# Patient Record
Sex: FEMALE | Race: BLACK OR AFRICAN AMERICAN | Employment: FULL TIME | URBAN - METROPOLITAN AREA
[De-identification: names, ages, dates, MRNs, and addresses within clinical notes are randomized per-mention and may not be internally consistent; named-entity substitution may affect disease eponyms.]

---

## 2017-02-17 ENCOUNTER — TELEPHONE (OUTPATIENT)
Dept: FAMILY MEDICINE CLINIC | Facility: CLINIC | Age: 47
End: 2017-02-17

## 2017-02-17 ENCOUNTER — OFFICE VISIT (OUTPATIENT)
Dept: FAMILY MEDICINE CLINIC | Facility: CLINIC | Age: 47
End: 2017-02-17

## 2017-02-17 VITALS
HEIGHT: 63 IN | BODY MASS INDEX: 31.89 KG/M2 | DIASTOLIC BLOOD PRESSURE: 90 MMHG | HEART RATE: 70 BPM | WEIGHT: 180 LBS | TEMPERATURE: 99 F | RESPIRATION RATE: 16 BRPM | SYSTOLIC BLOOD PRESSURE: 140 MMHG | OXYGEN SATURATION: 96 %

## 2017-02-17 DIAGNOSIS — G89.29 CHRONIC BILATERAL LOW BACK PAIN WITHOUT SCIATICA: ICD-10-CM

## 2017-02-17 DIAGNOSIS — J00 NASOPHARYNGITIS ACUTE: Primary | ICD-10-CM

## 2017-02-17 DIAGNOSIS — M99.03 LUMBAR REGION SOMATIC DYSFUNCTION: ICD-10-CM

## 2017-02-17 DIAGNOSIS — J11.1 FLU SYNDROME: ICD-10-CM

## 2017-02-17 DIAGNOSIS — M54.50 CHRONIC BILATERAL LOW BACK PAIN WITHOUT SCIATICA: ICD-10-CM

## 2017-02-17 DIAGNOSIS — Z12.39 BREAST CANCER SCREENING: ICD-10-CM

## 2017-02-17 PROBLEM — J06.9 URI (UPPER RESPIRATORY INFECTION): Status: ACTIVE | Noted: 2017-02-17

## 2017-02-17 PROCEDURE — 98927 OSTEOPATH MANJ 5-6 REGIONS: CPT | Performed by: FAMILY MEDICINE

## 2017-02-17 PROCEDURE — 99214 OFFICE O/P EST MOD 30 MIN: CPT | Performed by: FAMILY MEDICINE

## 2017-02-17 NOTE — PROGRESS NOTES
HPI:    Patient ID: Rayne Villagomez is a 48year old female. Cough  This is a new problem. The current episode started in the past 7 days. The problem has been unchanged. The problem occurs every few hours. The cough is non-productive.  Associated symptoms Positive for nausea. Negative for vomiting. Musculoskeletal: Positive for myalgias. Skin: Negative for rash. Neurological: Positive for weakness and headaches. No current outpatient prescriptions on file.   Allergies:No Known Allergies without sciatica  Pain management via prescription medications as needed. 4. Lumbar somatic dysfunction  Immediate relief after manipulation performed. No orders of the defined types were placed in this encounter.        Meds This Visit:  No prescript

## 2017-02-17 NOTE — PATIENT INSTRUCTIONS
Zithromax and allegra D 12 H prescribed. Clear fluid hydration. Rest. Take all medications as prescribed. If symptoms persist or worsen please call or return to clinic ASAP. Too late for Tamiflu. Status currently totally disabled.  Insurance form filled out

## 2017-02-24 ENCOUNTER — OFFICE VISIT (OUTPATIENT)
Dept: FAMILY MEDICINE CLINIC | Facility: CLINIC | Age: 47
End: 2017-02-24

## 2017-02-24 ENCOUNTER — APPOINTMENT (OUTPATIENT)
Dept: LAB | Age: 47
End: 2017-02-24
Attending: FAMILY MEDICINE
Payer: COMMERCIAL

## 2017-02-24 ENCOUNTER — HOSPITAL ENCOUNTER (OUTPATIENT)
Dept: MAMMOGRAPHY | Age: 47
Discharge: HOME | End: 2017-02-24
Attending: FAMILY MEDICINE
Payer: COMMERCIAL

## 2017-02-24 VITALS
HEART RATE: 61 BPM | TEMPERATURE: 98 F | HEIGHT: 63 IN | SYSTOLIC BLOOD PRESSURE: 144 MMHG | DIASTOLIC BLOOD PRESSURE: 92 MMHG | RESPIRATION RATE: 16 BRPM

## 2017-02-24 DIAGNOSIS — Z12.39 BREAST CANCER SCREENING: ICD-10-CM

## 2017-02-24 DIAGNOSIS — Z11.3 SCREEN FOR STD (SEXUALLY TRANSMITTED DISEASE): ICD-10-CM

## 2017-02-24 DIAGNOSIS — J00 ACUTE NASOPHARYNGITIS: ICD-10-CM

## 2017-02-24 DIAGNOSIS — J98.8 VIRAL RESPIRATORY ILLNESS: Primary | ICD-10-CM

## 2017-02-24 DIAGNOSIS — B97.89 VIRAL RESPIRATORY ILLNESS: Primary | ICD-10-CM

## 2017-02-24 DIAGNOSIS — Z00.00 PREVENTATIVE HEALTH CARE: ICD-10-CM

## 2017-02-24 PROBLEM — B34.9 VIRAL ILLNESS: Status: ACTIVE | Noted: 2017-02-24

## 2017-02-24 PROCEDURE — 86803 HEPATITIS C AB TEST: CPT

## 2017-02-24 PROCEDURE — 36415 COLL VENOUS BLD VENIPUNCTURE: CPT

## 2017-02-24 PROCEDURE — 86706 HEP B SURFACE ANTIBODY: CPT

## 2017-02-24 PROCEDURE — 77067 SCR MAMMO BI INCL CAD: CPT

## 2017-02-24 PROCEDURE — 99396 PREV VISIT EST AGE 40-64: CPT | Performed by: FAMILY MEDICINE

## 2017-02-24 PROCEDURE — 93005 ELECTROCARDIOGRAM TRACING: CPT | Performed by: FAMILY MEDICINE

## 2017-02-24 PROCEDURE — 99214 OFFICE O/P EST MOD 30 MIN: CPT | Performed by: FAMILY MEDICINE

## 2017-02-24 PROCEDURE — 80500 HEPATITIS A B + C PROFILE: CPT

## 2017-02-24 PROCEDURE — 86704 HEP B CORE ANTIBODY TOTAL: CPT

## 2017-02-24 PROCEDURE — 93010 ELECTROCARDIOGRAM REPORT: CPT | Performed by: FAMILY MEDICINE

## 2017-02-24 PROCEDURE — 87340 HEPATITIS B SURFACE AG IA: CPT

## 2017-02-24 PROCEDURE — 86708 HEPATITIS A ANTIBODY: CPT

## 2017-02-24 RX ORDER — FEXOFENADINE HCL AND PSEUDOEPHEDRINE HCI 60; 120 MG/1; MG/1
1 TABLET, EXTENDED RELEASE ORAL 2 TIMES DAILY
Qty: 30 TABLET | Refills: 0 | Status: SHIPPED | OUTPATIENT
Start: 2017-02-24

## 2017-02-24 RX ORDER — AZITHROMYCIN 250 MG/1
TABLET, FILM COATED ORAL
Qty: 6 TABLET | Refills: 0 | Status: SHIPPED | OUTPATIENT
Start: 2017-02-24 | End: 2017-03-01

## 2017-02-24 NOTE — PATIENT INSTRUCTIONS
Disability forms done. All adult screening ordered and done appropriate for patient's age and gender and risk factors and complaints. Clear fluid hydration. Rest. Take all medications as prescribed.  If symptoms persist or worsen please call or return to cl

## 2017-02-27 ENCOUNTER — TELEPHONE (OUTPATIENT)
Dept: ADMINISTRATIVE | Age: 47
End: 2017-02-27

## 2017-02-27 LAB
HAV AB SER QL IA: NONREACTIVE
HBV CORE AB SERPL QL IA: NONREACTIVE
HBV SURFACE AB SER-ACNC: <3.1 MIU/ML (ref ?–10)
HBV SURFACE AG SERPL QL IA: NONREACTIVE
HBV SURFACE AG SERPL QL IA: NONREACTIVE
HCV AB SERPL QL IA: NONREACTIVE

## 2017-02-27 NOTE — TELEPHONE ENCOUNTER
Eber afternoon Dr. Abby Valdez term disability form pending in [employer] for patient. Do you approve continuous leave from 2-10-17 with [employer] pending your reevaluation on 3-4-17 for viral illness? Please advise.   Amberly

## 2017-02-28 LAB
ABSOLUTE BASOPHILS: 34 CELLS/UL (ref 0–200)
ABSOLUTE EOSINOPHILS: 103 CELLS/UL (ref 15–500)
ABSOLUTE LYMPHOCYTES: 2022 CELLS/UL (ref 850–3900)
ABSOLUTE MONOCYTES: 266 CELLS/UL (ref 200–950)
ABSOLUTE NEUTROPHILS: 1376 CELLS/UL (ref 1500–7800)
ALBUMIN/GLOBULIN RATIO: 1.4 (CALC) (ref 1–2.5)
ALBUMIN: 4.4 G/DL (ref 3.6–5.1)
ALKALINE PHOSPHATASE: 63 U/L (ref 33–115)
ALT: 29 U/L (ref 6–29)
AST: 36 U/L (ref 10–35)
BASOPHILS: 0.9 %
BILIRUBIN, TOTAL: 0.5 MG/DL (ref 0.2–1.2)
BILIRUBIN: NEGATIVE
BUN: 9 MG/DL (ref 7–25)
CALCIUM: 9.4 MG/DL (ref 8.6–10.2)
CARBON DIOXIDE: 26 MMOL/L (ref 20–31)
CHLORIDE: 106 MMOL/L (ref 98–110)
CHOL/HDLC RATIO: 4.7 (CALC)
CHOLESTEROL, TOTAL: 274 MG/DL (ref 125–200)
COLOR: YELLOW
CREATININE: 0.77 MG/DL (ref 0.5–1.1)
EGFR IF AFRICN AM: 106 ML/MIN/1.73M2
EGFR IF NONAFRICN AM: 91 ML/MIN/1.73M2
EOSINOPHILS: 2.7 %
GLOBULIN: 3.2 G/DL (CALC) (ref 1.9–3.7)
GLUCOSE: 118 MG/DL (ref 65–99)
GLUCOSE: NEGATIVE
HDL CHOLESTEROL: 58 MG/DL
HEMATOCRIT: 39.4 % (ref 35–45)
HEMOGLOBIN: 13.5 G/DL (ref 11.7–15.5)
KETONES: NEGATIVE
LDL-CHOLESTEROL: 192 MG/DL (CALC)
LYMPHOCYTES: 53.2 %
MCH: 31 PG (ref 27–33)
MCHC: 34.3 G/DL (ref 32–36)
MCV: 90.6 FL (ref 80–100)
MONOCYTES: 7 %
MPV: 9.7 FL (ref 7.5–12.5)
NEUTROPHILS: 36.2 %
NITRITE: NEGATIVE
NON-HDL CHOLESTEROL: 216 MG/DL (CALC)
OCCULT BLOOD: NEGATIVE
PH: 6 (ref 5–8)
PLATELET COUNT: 292 THOUSAND/UL (ref 140–400)
POTASSIUM: 4.2 MMOL/L (ref 3.5–5.3)
PROTEIN, TOTAL: 7.6 G/DL (ref 6.1–8.1)
RDW: 13.9 % (ref 11–15)
RED BLOOD CELL COUNT: 4.35 MILLION/UL (ref 3.8–5.1)
SODIUM: 141 MMOL/L (ref 135–146)
SPECIFIC GRAVITY: 1.02 (ref 1–1.03)
TRIGLYCERIDES: 122 MG/DL
TSH W/REFLEX TO FT4: 1.05 MIU/L
WHITE BLOOD CELL COUNT: 3.8 THOUSAND/UL (ref 3.8–10.8)

## 2017-03-04 ENCOUNTER — OFFICE VISIT (OUTPATIENT)
Dept: FAMILY MEDICINE CLINIC | Facility: CLINIC | Age: 47
End: 2017-03-04

## 2017-03-04 ENCOUNTER — APPOINTMENT (OUTPATIENT)
Dept: LAB | Age: 47
End: 2017-03-04
Attending: FAMILY MEDICINE
Payer: COMMERCIAL

## 2017-03-04 VITALS
SYSTOLIC BLOOD PRESSURE: 130 MMHG | DIASTOLIC BLOOD PRESSURE: 90 MMHG | TEMPERATURE: 98 F | HEART RATE: 74 BPM | HEIGHT: 63 IN | RESPIRATION RATE: 16 BRPM

## 2017-03-04 DIAGNOSIS — E78.5 HYPERLIPIDEMIA, UNSPECIFIED HYPERLIPIDEMIA TYPE: ICD-10-CM

## 2017-03-04 DIAGNOSIS — B34.9 VIRAL ILLNESS: Primary | ICD-10-CM

## 2017-03-04 DIAGNOSIS — R73.9 HYPERGLYCEMIA: ICD-10-CM

## 2017-03-04 DIAGNOSIS — R03.0 ELEVATED BLOOD PRESSURE READING: ICD-10-CM

## 2017-03-04 PROCEDURE — 99214 OFFICE O/P EST MOD 30 MIN: CPT | Performed by: FAMILY MEDICINE

## 2017-03-04 PROCEDURE — 99212 OFFICE O/P EST SF 10 MIN: CPT | Performed by: FAMILY MEDICINE

## 2017-03-04 NOTE — PATIENT INSTRUCTIONS
Recommend weight loss via daily exercising and consistent healthy dietary changes. Hydrate well. Back to work 03/06/17.

## 2017-03-04 NOTE — PROGRESS NOTES
HPI:    Patient ID: Mark Copeland is a 48year old female. URI   The problem has been resolved. Associated symptoms include congestion, coughing, headaches, rhinorrhea and sinus pain.  She has tried decongestant, antihistamine and increased fluids (antib Cardiovascular: Normal rate and regular rhythm. Exam reveals no gallop. No murmur heard. Pulmonary/Chest: Effort normal and breath sounds normal. No respiratory distress. ASSESSMENT/PLAN:   1. Viral illness  Resolved    2.  Hyperlipidemi

## 2017-03-06 ENCOUNTER — TELEPHONE (OUTPATIENT)
Dept: FAMILY MEDICINE CLINIC | Facility: CLINIC | Age: 47
End: 2017-03-06

## 2017-03-06 NOTE — TELEPHONE ENCOUNTER
Pt was seen sat 03 04 2017 by Dr Opal Blanchard, pt forgot to get a return back to work note, pt been off work since 02 10 2017, and is returning back today 03 06 2017, pt is waiting in the [facility]PO waiting room

## 2017-03-08 LAB — HEMOGLOBIN A1C: 6.5 % OF TOTAL HGB

## 2018-01-17 ENCOUNTER — CHARTING TRANS (OUTPATIENT)
Dept: OTHER | Age: 48
End: 2018-01-17

## 2018-11-02 VITALS
HEART RATE: 85 BPM | BODY MASS INDEX: 33.01 KG/M2 | DIASTOLIC BLOOD PRESSURE: 80 MMHG | SYSTOLIC BLOOD PRESSURE: 130 MMHG | OXYGEN SATURATION: 100 % | RESPIRATION RATE: 16 BRPM | WEIGHT: 186.29 LBS | HEIGHT: 63 IN | TEMPERATURE: 98.9 F

## 2022-08-15 ENCOUNTER — TELEPHONE (OUTPATIENT)
Dept: OTHER | Age: 52
End: 2022-08-15

## 2022-08-15 NOTE — TELEPHONE ENCOUNTER
Dionte Assessment. Last visit 2017. New patient protocol. Patient calling office, transferred to triage from Call Center. Wants to see Dr. Mandeep Wang only. Reports hives currently on chest, stomach, sides, upper thighs, proximal to groin, left wrist  Onset -- 1 month ago. Daily occurrence. Improves with Benadryl. But comes back. Went to a clinic, [address] [place] [address] along the [facility]. She thought it was an ER, but they could not draw blood or do additional testing there. She was given 50 mg tablets of prednisone. Rx benadryl anophen 25mg take 1 capsule Q6h PRN as needed for sleep. Denies shortness of breath, difficulty breathing, chest pain, chest pressure. Speaking in full sentences. Itchy. Seems to happen after eating sweets, such as Sprite and cheesecake, Caramel popcorn . RN advised reading labels and ingredients to better understand what she is allergic to. She verbalizes understanding. Due to current hives, RN advised Immediate [facility], address provided, and then make Annual Physical with Dr. Mandeep Wang. She verbalizes understanding of all information, and agreeable to plan.

## 2022-08-18 ENCOUNTER — OFFICE VISIT (OUTPATIENT)
Dept: FAMILY MEDICINE CLINIC | Facility: CLINIC | Age: 52
End: 2022-08-18
Payer: COMMERCIAL

## 2022-08-18 VITALS
SYSTOLIC BLOOD PRESSURE: 149 MMHG | OXYGEN SATURATION: 97 % | WEIGHT: 180.38 LBS | TEMPERATURE: 98 F | HEART RATE: 76 BPM | BODY MASS INDEX: 31.96 KG/M2 | RESPIRATION RATE: 16 BRPM | DIASTOLIC BLOOD PRESSURE: 88 MMHG | HEIGHT: 63 IN

## 2022-08-18 DIAGNOSIS — I10 PRIMARY HYPERTENSION: ICD-10-CM

## 2022-08-18 DIAGNOSIS — L50.9 URTICARIA: ICD-10-CM

## 2022-08-18 DIAGNOSIS — T78.3XXD ANGIOEDEMA, SUBSEQUENT ENCOUNTER: Primary | ICD-10-CM

## 2022-08-18 DIAGNOSIS — M06.341: ICD-10-CM

## 2022-08-18 DIAGNOSIS — R73.03 PREDIABETES: ICD-10-CM

## 2022-08-18 PROCEDURE — 3079F DIAST BP 80-89 MM HG: CPT | Performed by: FAMILY MEDICINE

## 2022-08-18 PROCEDURE — 3077F SYST BP >= 140 MM HG: CPT | Performed by: FAMILY MEDICINE

## 2022-08-18 PROCEDURE — 99214 OFFICE O/P EST MOD 30 MIN: CPT | Performed by: FAMILY MEDICINE

## 2022-08-18 PROCEDURE — 3008F BODY MASS INDEX DOCD: CPT | Performed by: FAMILY MEDICINE

## 2022-08-18 RX ORDER — AMLODIPINE BESYLATE 10 MG/1
10 TABLET ORAL DAILY
COMMUNITY
Start: 2020-10-27 | End: 2022-08-18

## 2022-08-18 RX ORDER — FLASH GLUCOSE SENSOR
1 KIT MISCELLANEOUS
Qty: 1 EACH | Refills: 11 | Status: SHIPPED | OUTPATIENT
Start: 2022-08-18

## 2022-08-18 RX ORDER — METFORMIN HYDROCHLORIDE 500 MG/1
500 TABLET, EXTENDED RELEASE ORAL 2 TIMES DAILY
Qty: 180 TABLET | Refills: 1 | Status: SHIPPED | OUTPATIENT
Start: 2022-08-18

## 2022-08-18 RX ORDER — FLASH GLUCOSE SCANNING READER
1 EACH MISCELLANEOUS
Qty: 1 EACH | Refills: 0 | Status: SHIPPED | OUTPATIENT
Start: 2022-08-18

## 2022-08-18 RX ORDER — AMLODIPINE BESYLATE 10 MG/1
10 TABLET ORAL DAILY
Qty: 90 TABLET | Refills: 1 | Status: SHIPPED | OUTPATIENT
Start: 2022-08-18

## 2022-08-18 RX ORDER — DIPHENHYDRAMINE HCL 25 MG
25 CAPSULE ORAL
COMMUNITY
Start: 2022-08-02

## 2022-08-18 RX ORDER — METFORMIN HYDROCHLORIDE 500 MG/1
1 TABLET, EXTENDED RELEASE ORAL 2 TIMES DAILY
COMMUNITY
Start: 2021-01-20 | End: 2022-08-18

## 2022-08-18 NOTE — PATIENT INSTRUCTIONS
Medrol Dosepak prescribed. Patient can continue with the Allegra while doing the Medrol Dosepak. Increase water consumption. Decrease dairy consumption if you are a dairy summer. Patient referred to allergy/immunology for work-up for urticaria/angioedema. Patient referred to plastics/hand. Freestyle cody also prescribed. Encouraged physical fitness and daily physical activity daily (PLAY). Recommend weight loss via daily exercising and consistent healthy dietary changes.

## 2022-09-04 NOTE — Clinical Note
3/6/2017          To Whom It May Concern:    Winnie Sicard is currently under my medical care and may not return to work at this time. Please excuse [place] for 4 weeks. Beginning 02/10/2017  She may return to work on 03/06/2017.   Activity is restricted
No

## 2022-10-04 ENCOUNTER — OFFICE VISIT (OUTPATIENT)
Dept: ALLERGY | Facility: CLINIC | Age: 52
End: 2022-10-04
Payer: COMMERCIAL

## 2022-10-04 ENCOUNTER — NURSE ONLY (OUTPATIENT)
Dept: ALLERGY | Facility: CLINIC | Age: 52
End: 2022-10-04
Payer: COMMERCIAL

## 2022-10-04 VITALS — OXYGEN SATURATION: 100 % | HEART RATE: 85 BPM | SYSTOLIC BLOOD PRESSURE: 126 MMHG | DIASTOLIC BLOOD PRESSURE: 84 MMHG

## 2022-10-04 DIAGNOSIS — J30.89 ENVIRONMENTAL AND SEASONAL ALLERGIES: ICD-10-CM

## 2022-10-04 DIAGNOSIS — J30.89 SEASONAL AND PERENNIAL ALLERGIC RHINOCONJUNCTIVITIS: ICD-10-CM

## 2022-10-04 DIAGNOSIS — L50.1 CHRONIC IDIOPATHIC URTICARIA: Primary | ICD-10-CM

## 2022-10-04 DIAGNOSIS — Z23 FLU VACCINE NEED: ICD-10-CM

## 2022-10-04 DIAGNOSIS — Z92.29 COVID-19 VACCINE SERIES COMPLETED: ICD-10-CM

## 2022-10-04 DIAGNOSIS — Z91.018 FOOD ALLERGY: ICD-10-CM

## 2022-10-04 DIAGNOSIS — J30.2 SEASONAL AND PERENNIAL ALLERGIC RHINOCONJUNCTIVITIS: ICD-10-CM

## 2022-10-04 DIAGNOSIS — T78.3XXA ANGIOEDEMA, INITIAL ENCOUNTER: ICD-10-CM

## 2022-10-04 DIAGNOSIS — H10.10 SEASONAL AND PERENNIAL ALLERGIC RHINOCONJUNCTIVITIS: ICD-10-CM

## 2022-10-04 PROCEDURE — 95024 IQ TESTS W/ALLERGENIC XTRCS: CPT | Performed by: ALLERGY & IMMUNOLOGY

## 2022-10-04 PROCEDURE — 95004 PERQ TESTS W/ALRGNC XTRCS: CPT | Performed by: ALLERGY & IMMUNOLOGY

## 2022-10-04 RX ORDER — LEVOCETIRIZINE DIHYDROCHLORIDE 5 MG/1
5 TABLET, FILM COATED ORAL EVERY EVENING
Qty: 90 TABLET | Refills: 1 | Status: SHIPPED | OUTPATIENT
Start: 2022-10-04

## 2022-10-04 NOTE — PATIENT INSTRUCTIONS
#1 chronic idiopathic urticaria  Handouts on chronic hives provided and reviewed including the majority being idiopathic in nature with no specific cause found  See above skin testing to common food and environmental allergens to screen for allergic trigger  Check TSH and C4  Continue with Allegra 180 mg once a day up to 4 times per day if needed  If worsening with Allegra then may consider Xyzal in place of Allegra. Xyzal dosing would be 5 mg once a day up to 4 times per day if needed   Consider Xolair if refractory to antihistamines      #2 allergic rhinitis  Reviewed avoidance measures and potential treatment option of immunotherapy  Continue with Allegra 180 mg once a day if having runny nose sneezing itchy watery eyes  May add Flonase or Nasacort 2 sprays per nostril once a day if having prominent nasal congestion postnasal drip    #3 COVID vaccination up-to-date x3 doses.   Reviewed booster fourth dose is indicated for 50 and older    #4 flu vaccine recommended this fall

## 2023-02-06 ENCOUNTER — TELEPHONE (OUTPATIENT)
Dept: ALLERGY | Facility: CLINIC | Age: 53
End: 2023-02-06

## 2023-02-06 NOTE — TELEPHONE ENCOUNTER
Labs from 10/4/2022 have not been completed. Letter sent home. Postponed x 2 months. Dr. Campa Nikhil, if labs have not been completed in that time okay to cancel?

## 2023-02-21 RX ORDER — AMLODIPINE BESYLATE 10 MG/1
TABLET ORAL
Qty: 90 TABLET | Refills: 1 | Status: SHIPPED | OUTPATIENT
Start: 2023-02-21

## 2023-02-21 RX ORDER — METFORMIN HYDROCHLORIDE 500 MG/1
TABLET, EXTENDED RELEASE ORAL
Qty: 180 TABLET | Refills: 1 | Status: SHIPPED | OUTPATIENT
Start: 2023-02-21

## 2023-04-14 ENCOUNTER — TELEPHONE (OUTPATIENT)
Dept: FAMILY MEDICINE CLINIC | Facility: CLINIC | Age: 53
End: 2023-04-14

## 2023-04-14 DIAGNOSIS — Z12.31 ENCOUNTER FOR SCREENING MAMMOGRAM FOR BREAST CANCER: Primary | ICD-10-CM

## 2023-04-14 NOTE — TELEPHONE ENCOUNTER
Referral for [facility] has been generated in the chart if you can just give them a call to schedule an appointment.

## 2023-09-21 RX ORDER — AMLODIPINE BESYLATE 10 MG/1
10 TABLET ORAL DAILY
Qty: 90 TABLET | Refills: 0 | Status: SHIPPED | OUTPATIENT
Start: 2023-09-21

## 2023-09-21 RX ORDER — METFORMIN HYDROCHLORIDE 500 MG/1
500 TABLET, EXTENDED RELEASE ORAL 2 TIMES DAILY
Qty: 180 TABLET | Refills: 0 | Status: SHIPPED | OUTPATIENT
Start: 2023-09-21

## 2023-09-22 ENCOUNTER — TELEPHONE (OUTPATIENT)
Dept: FAMILY MEDICINE CLINIC | Facility: CLINIC | Age: 53
End: 2023-09-22

## 2023-09-22 NOTE — TELEPHONE ENCOUNTER
Pt needs a physical appt with Dr Soren Babb only. Her medication will run out in 90 days. His first availability was next year Jan 30th, 2024 pt declined. .    Call her at only number on file.

## 2023-09-22 NOTE — TELEPHONE ENCOUNTER
Please review. Protocol failed / No Protocol.     Requested Prescriptions   Pending Prescriptions Disp Refills    METFORMIN  MG Oral Tablet 24 Hr [Pharmacy Med Name: METFORMIN ER 500MG 24HR TABS] 180 tablet 1     Sig: TAKE 1 TABLET(500 MG) BY MOUTH TWICE DAILY       Diabetes Medication Protocol Failed - 9/21/2023  6:12 AM        Failed - Last A1C < 7.5 and within past 6 months     Lab Results   Component Value Date    A1C 6.5 (H) 03/04/2017             Failed - In person appointment or virtual visit in the past 6 mos or appointment in next 3 mos     Recent Outpatient Visits              11 months ago Environmental and seasonal allergies    [facility], [address] [facility] [address]    Nurse Only    11 months ago Chronic idiopathic urticaria    [facility], Jeanie Campbell MD    Office Visit    1 year ago Angioedema, subsequent encounter    [address] [facility], Uriel Murray, DO    Office Visit    6 years ago Viral illness    [address] [facility], Uriel Murray, DO    Office Visit    6 years ago Viral respiratory illness    [facility], [address], Robe ADAMSON, DO    Office Visit                      Failed - EGFRCR or GFRAA > 50     GFR Evaluation            Failed - GFR in the past 12 months          AMLODIPINE 10 MG Oral Tab [Pharmacy Med Name: AMLODIPINE BESYLATE 10MG TABLETS] 90 tablet 1     Sig: TAKE 1 TABLET(10 MG) BY MOUTH DAILY       Hypertensive Medications Protocol Failed - 9/21/2023  6:12 AM        Failed - In person appointment in the past 12 or next 3 months     Recent Outpatient Visits              11 months ago Environmental and seasonal allergies    [facility], [address], [place]    Nurse Only    11 months ago [facility] idiopathic urticaria    [facility]    Office Visit    1 year ago Angioedema, subsequent encounter    [address], [place], [place], [place]    Office Visit    6 years ago Viral illness    [address], [place], [facility], [place]    Office Visit    6 years ago Viral respiratory illness    [facility] [place], [place]    Office Visit                      Failed - CMP or BMP in past 6 months     No results found for this or any previous visit (from the past 4392 hour(s)).             Failed - In person appointment or virtual visit in the past 6 months     Recent Outpatient Visits              11 months ago Environmental and seasonal allergies    [facility], [address]    Nurse Only    11 months ago Chronic idiopathic urticaria    [facility], [address] Jeanie [address]aylor MD    Office Visit    1 year ago Angioedema, subsequent encounter    [address], [place], [facility],     Office Visit    6 years ago Viral illness    [address], [facility], [place]    Office Visit    6 years ago Viral respiratory illness    [facility], [facility], [place], [place]    Office Visit                      Failed - EGFRCR or GFRAA > 50     GFR Evaluation            Passed - Last BP reading less than 140/90     BP Readings from Last 1 Encounters:  10/04/22 : 126/84

## 2023-09-23 NOTE — TELEPHONE ENCOUNTER
Spoke to patient and appt given for 1/30/2024 at 2pm with Dr Jenna Burdick. Pt verbalized understanding.

## 2024-01-30 ENCOUNTER — LAB ENCOUNTER (OUTPATIENT)
Dept: LAB | Age: 54
End: 2024-01-30
Attending: FAMILY MEDICINE
Payer: COMMERCIAL

## 2024-01-30 ENCOUNTER — OFFICE VISIT (OUTPATIENT)
Dept: FAMILY MEDICINE CLINIC | Facility: CLINIC | Age: 54
End: 2024-01-30

## 2024-01-30 VITALS
BODY MASS INDEX: 31.89 KG/M2 | SYSTOLIC BLOOD PRESSURE: 145 MMHG | DIASTOLIC BLOOD PRESSURE: 82 MMHG | WEIGHT: 180 LBS | OXYGEN SATURATION: 96 % | HEIGHT: 63 IN | HEART RATE: 100 BPM

## 2024-01-30 DIAGNOSIS — I10 PRIMARY HYPERTENSION: ICD-10-CM

## 2024-01-30 DIAGNOSIS — Z12.31 ENCOUNTER FOR SCREENING MAMMOGRAM FOR BREAST CANCER: ICD-10-CM

## 2024-01-30 DIAGNOSIS — Z00.00 ENCOUNTER FOR ANNUAL PHYSICAL EXAM: Primary | ICD-10-CM

## 2024-01-30 DIAGNOSIS — Z23 NEED FOR DIPHTHERIA-TETANUS-PERTUSSIS (TDAP) VACCINE: ICD-10-CM

## 2024-01-30 DIAGNOSIS — Z23 NEED FOR ZOSTER VACCINATION: ICD-10-CM

## 2024-01-30 DIAGNOSIS — R73.03 PREDIABETES: ICD-10-CM

## 2024-01-30 DIAGNOSIS — N95.1 MENOPAUSAL SYNDROME (HOT FLASHES): ICD-10-CM

## 2024-01-30 DIAGNOSIS — Z12.11 COLON CANCER SCREENING: ICD-10-CM

## 2024-01-30 DIAGNOSIS — L50.9 URTICARIA: ICD-10-CM

## 2024-01-30 DIAGNOSIS — E66.9 CLASS 1 OBESITY WITH SERIOUS COMORBIDITY AND BODY MASS INDEX (BMI) OF 31.0 TO 31.9 IN ADULT, UNSPECIFIED OBESITY TYPE: ICD-10-CM

## 2024-01-30 PROCEDURE — 99213 OFFICE O/P EST LOW 20 MIN: CPT | Performed by: FAMILY MEDICINE

## 2024-01-30 PROCEDURE — 90750 HZV VACC RECOMBINANT IM: CPT | Performed by: FAMILY MEDICINE

## 2024-01-30 PROCEDURE — 99396 PREV VISIT EST AGE 40-64: CPT | Performed by: FAMILY MEDICINE

## 2024-01-30 PROCEDURE — 3079F DIAST BP 80-89 MM HG: CPT | Performed by: FAMILY MEDICINE

## 2024-01-30 PROCEDURE — 3008F BODY MASS INDEX DOCD: CPT | Performed by: FAMILY MEDICINE

## 2024-01-30 PROCEDURE — 3077F SYST BP >= 140 MM HG: CPT | Performed by: FAMILY MEDICINE

## 2024-01-30 PROCEDURE — 90471 IMMUNIZATION ADMIN: CPT | Performed by: FAMILY MEDICINE

## 2024-01-30 PROCEDURE — 90715 TDAP VACCINE 7 YRS/> IM: CPT | Performed by: FAMILY MEDICINE

## 2024-01-30 PROCEDURE — 90472 IMMUNIZATION ADMIN EACH ADD: CPT | Performed by: FAMILY MEDICINE

## 2024-01-30 NOTE — PATIENT INSTRUCTIONS
All adult screening ordered and done appropriate for patient's age and gender and risk factors and complaints.  Recommend weight loss via daily exercising and consistent healthy dietary changes.  Encouraged physical fitness and daily physical activity daily.  Medication reviewed and renewed where needed and appropriate.  Comply with medications.  Mammogram ordered.  Gastro referral.

## 2024-01-30 NOTE — PROGRESS NOTES
Subjective:     Patient ID: Tino Hoff is a 53 year old female.    53 year old obese by definition/hypertensive/prediabetic AA female here for complete preventive care physical and for status update on any confirmed chronic medical illnesses and follow up on any previous labs or procedures that were suggestive or in need of further work up. Colonoscopy is due. Bowel and bladder functions are intact.    Patient denies headaches, chest pain, dizziness, shortness of breath, visual changes, exertional fatigue, urinary frequency, or excessive hunger and thirst.    Patient consents to zoster and Tdap vaccine.  Patient declines on influenza vaccine.    Patient is motivated for weight loss and would like to try one of the injectable diabetic/weight management products.    Patient still suffering from excessive night sweats and fluctuating blood pressures and spite her personal efforts to improve when she can.  Patient also complains of steady weight gain which all may be associated with menopausal syndrome.                History/Other:   Review of Systems  Current Outpatient Medications   Medication Sig Dispense Refill    metFORMIN  MG Oral Tablet 24 Hr Take 1 tablet (500 mg total) by mouth 2 (two) times daily. 180 tablet 0    amLODIPine 10 MG Oral Tab Take 1 tablet (10 mg total) by mouth daily. 90 tablet 0    Multiple Vitamin (MULTIVITAMIN ADULT OR) Take by mouth daily.      Continuous Blood Gluc  (FREESTYLE LEONARDO 2 READER) Does not apply Device 1 Stick TID AC&HS. 1 each 0    Continuous Blood Gluc Sensor (FREESTYLE LEONARDO 2 SENSOR) Does not apply Misc 1 Stick TID AC&HS. 1 each 11    levocetirizine 5 MG Oral Tab Take 1 tablet (5 mg total) by mouth every evening. (Patient not taking: Reported on 1/30/2024) 90 tablet 1    diphenhydrAMINE 25 MG Oral Cap Take 25 mg by mouth. (Patient not taking: Reported on 1/30/2024)      Fexofenadine-Pseudoephed ER  MG Oral Tablet 12 Hr Take 1 tablet by mouth 2 (two)  times daily. (Patient not taking: Reported on 1/30/2024) 30 tablet 0     Allergies:No Known Allergies    No past medical history on file.   Past Surgical History:   Procedure Laterality Date    HYSTERECTOMY  2007      Family History   Problem Relation Age of Onset    Stroke Mother     Cancer Sister         lung      Social History:   Social History     Socioeconomic History    Marital status: Unknown   Tobacco Use    Smoking status: Never    Smokeless tobacco: Never   Substance and Sexual Activity    Alcohol use: Yes     Comment: socially; 4 drinks per month   Other Topics Concern    Caffeine Concern Yes     Comment: coffee        Objective:   Vitals:    01/30/24 1431   BP: 145/82   Pulse:        Physical Exam  Constitutional:       General: She is not in acute distress.     Appearance: She is not ill-appearing.   HENT:      Head: Normocephalic and atraumatic.      Right Ear: Tympanic membrane normal.      Left Ear: Tympanic membrane normal.      Nose: Nose normal.      Mouth/Throat:      Mouth: Mucous membranes are moist.   Neck:      Thyroid: No thyromegaly.   Cardiovascular:      Rate and Rhythm: Normal rate and regular rhythm.      Heart sounds:      No gallop.   Pulmonary:      Effort: Pulmonary effort is normal. No respiratory distress.      Breath sounds: Normal breath sounds.   Neurological:      Mental Status: She is alert and oriented to person, place, and time.         Assessment & Plan:   1. Encounter for annual physical exam  General well exam and the following labs have been ordered  - CBC [6399] [Q]  - COMP METABOLIC PANEL [27814] [Q]  - LIPID PANEL [7600] [Q]  - TSH W REFLEX TO FREE T4 [31304][Q]  - URINALYSIS, ROUTINE [4973][Q]    2. Menopausal syndrome (hot flashes)  See patient instructions.  We might consider hormone replacement therapy.  Patient may benefit from clonidine patch.    3. Primary hypertension  Not quite to goal when measured manually or electronically.  Compliance with blood  pressure medication emphasized and encouraged.    4. Urticaria  Currently inactive.  We may consider allergy testing again.    5. Prediabetes  Prescribed.  - semaglutide-weight management 0.5 MG/0.5ML Subcutaneous Solution Auto-injector; Inject 0.5 mL (0.5 mg total) into the skin once a week for 4 doses.  Dispense: 2 mL; Refill: 0  - HGB A1C [496] [Q]    6. Colon cancer screening  Referred.  - Gastro Referral - Jeanie ([place])    7. Encounter for screening mammogram for breast cancer  Mammogram ordered.  - [facility] DEE DEE 2D+3D SCREENING BILAT (CPT=77067/01496); Future    8. Need for diphtheria-tetanus-pertussis (Tdap) vaccine  Ordered and administered on today.  - TETANUS, DIPHTHERIA TOXOIDS AND ACELLULAR PERTUSIS VACCINE (TDAP), >7 YEARS, IM USE    9. Need for zoster vaccination  Ordered and administered on today.  - Zoster Recombinant Adjuvanted (Shingrix -Shingles) [48582]    10. Class 1 obesity with serious comorbidity and body mass index (BMI) of 31.0 to 31.9 in adult, unspecified obesity type  Prescribed.  - semaglutide-weight management 0.5 MG/0.5ML Subcutaneous Solution Auto-injector; Inject 0.5 mL (0.5 mg total) into the skin once a week for 4 doses.  Dispense: 2 mL; Refill: 0  - HGB A1C [496] [Q]      No orders of the defined types were placed in this encounter.      Meds This Visit:  Requested Prescriptions      No prescriptions requested or ordered in this encounter       Imaging & Referrals:  None     Patient Instructions   All adult screening ordered and done appropriate for patient's age and gender and risk factors and complaints.  Recommend weight loss via daily exercising and consistent healthy dietary changes.  Encouraged physical fitness and daily physical activity daily.  Medication reviewed and renewed where needed and appropriate.  Comply with medications.  Mammogram ordered.  Gastro referral.    Return in about 1 year (around 1/30/2025), or if symptoms worsen or fail to improve.

## 2024-01-31 LAB
ABSOLUTE BASOPHILS: 70 CELLS/UL (ref 0–200)
ABSOLUTE EOSINOPHILS: 70 CELLS/UL (ref 15–500)
ABSOLUTE LYMPHOCYTES: 2259 CELLS/UL (ref 850–3900)
ABSOLUTE MONOCYTES: 250 CELLS/UL (ref 200–950)
ABSOLUTE NEUTROPHILS: 1451 CELLS/UL (ref 1500–7800)
ALBUMIN/GLOBULIN RATIO: 1.3 (CALC) (ref 1–2.5)
ALBUMIN: 4.5 G/DL (ref 3.6–5.1)
ALKALINE PHOSPHATASE: 76 U/L (ref 37–153)
ALT: 29 U/L (ref 6–29)
APPEARANCE: CLEAR
AST: 14 U/L (ref 10–35)
BASOPHILS: 1.7 %
BILIRUBIN, TOTAL: 0.4 MG/DL (ref 0.2–1.2)
BILIRUBIN: NEGATIVE
BUN: 11 MG/DL (ref 7–25)
CALCIUM: 10.1 MG/DL (ref 8.6–10.4)
CARBON DIOXIDE: 23 MMOL/L (ref 20–32)
CHLORIDE: 102 MMOL/L (ref 98–110)
CHOL/HDLC RATIO: 4.7 (CALC)
CHOLESTEROL, TOTAL: 315 MG/DL
COLOR: YELLOW
CREATININE: 0.72 MG/DL (ref 0.5–1.03)
EGFR: 99 ML/MIN/1.73M2
EOSINOPHILS: 1.7 %
GLOBULIN: 3.4 G/DL (CALC) (ref 1.9–3.7)
GLUCOSE: 267 MG/DL (ref 65–99)
HDL CHOLESTEROL: 67 MG/DL
HEMATOCRIT: 38.8 % (ref 35–45)
HEMOGLOBIN A1C: 9.9 % OF TOTAL HGB
HEMOGLOBIN: 13.6 G/DL (ref 11.7–15.5)
KETONES: NEGATIVE
LDL-CHOLESTEROL: 212 MG/DL (CALC)
LEUKOCYTE ESTERASE: NEGATIVE
LYMPHOCYTES: 55.1 %
MCH: 31.2 PG (ref 27–33)
MCHC: 35.1 G/DL (ref 32–36)
MCV: 89 FL (ref 80–100)
MONOCYTES: 6.1 %
MPV: 11.2 FL (ref 7.5–12.5)
NEUTROPHILS: 35.4 %
NITRITE: NEGATIVE
NON-HDL CHOLESTEROL: 248 MG/DL (CALC)
OCCULT BLOOD: NEGATIVE
PLATELET COUNT: 340 THOUSAND/UL (ref 140–400)
POTASSIUM: 3.9 MMOL/L (ref 3.5–5.3)
PROTEIN, TOTAL: 7.9 G/DL (ref 6.1–8.1)
PROTEIN: NEGATIVE
RDW: 12.7 % (ref 11–15)
RED BLOOD CELL COUNT: 4.36 MILLION/UL (ref 3.8–5.1)
SODIUM: 137 MMOL/L (ref 135–146)
SPECIFIC GRAVITY: 1.04 (ref 1–1.03)
TRIGLYCERIDES: 184 MG/DL
TSH W/REFLEX TO FT4: 1.27 MIU/L
WHITE BLOOD CELL COUNT: 4.1 THOUSAND/UL (ref 3.8–10.8)

## 2024-02-04 DIAGNOSIS — E11.65 UNCONTROLLED TYPE 2 DIABETES MELLITUS WITH HYPERGLYCEMIA (HCC): Primary | ICD-10-CM

## 2024-02-04 RX ORDER — LOSARTAN POTASSIUM 50 MG/1
50 TABLET ORAL DAILY
Qty: 90 TABLET | Refills: 1 | Status: SHIPPED | OUTPATIENT
Start: 2024-02-04

## 2024-02-04 RX ORDER — SIMVASTATIN 10 MG
10 TABLET ORAL DAILY
Qty: 90 TABLET | Refills: 1 | Status: SHIPPED | OUTPATIENT
Start: 2024-02-04 | End: 2024-08-02

## 2024-02-09 ENCOUNTER — TELEPHONE (OUTPATIENT)
Dept: ENDOCRINOLOGY | Facility: HOSPITAL | Age: 54
End: 2024-02-09

## 2024-02-09 DIAGNOSIS — E11.65 TYPE 2 DIABETES MELLITUS WITH HYPERGLYCEMIA (HCC): Primary | ICD-10-CM
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
## 2024-02-10 DIAGNOSIS — N95.1 MENOPAUSAL SYNDROME (HOT FLASHES): Primary | ICD-10-CM

## 2024-02-10 RX ORDER — CLONIDINE 0.2 MG/24H
1 PATCH, EXTENDED RELEASE TRANSDERMAL WEEKLY
Qty: 12 PATCH | Refills: 1 | Status: SHIPPED | OUTPATIENT
Start: 2024-02-10

## 2024-03-02 ENCOUNTER — HOSPITAL ENCOUNTER (OUTPATIENT)
Dept: ENDOCRINOLOGY | Facility: HOSPITAL | Age: 54
Discharge: HOME | End: 2024-03-02
Attending: FAMILY MEDICINE
Payer: COMMERCIAL

## 2024-03-02 VITALS — BODY MASS INDEX: 32 KG/M2 | WEIGHT: 178.81 LBS

## 2024-03-02 DIAGNOSIS — E11.65 TYPE 2 DIABETES MELLITUS WITH HYPERGLYCEMIA, WITHOUT LONG-TERM CURRENT USE OF INSULIN (HCC): Primary | ICD-10-CM

## 2024-03-02 PROCEDURE — 97802 MEDICAL NUTRITION INDIV IN: CPT

## 2024-03-02 NOTE — PROGRESS NOTES
Medical Nutrition Therapy Assessment    Tino Hoff 6/25/1970 was seen for individual Diabetic Medical Nutrition Therapy:  initial/individual    Date: 3/2/2024   Start time 1050  End time: 1135      Assessment  H/o diabetes for 5-6 years; she says that she is at her highest weight.  She says that she retired and thinks her diet got off track but now works part-time again and is doing somewhat better. She admits that he problem is that she is always thinking about food and she is a grazer, so she may snack too much.  She is trying to get more physical activity.  Pt reports that she has been having hot flashes and is not sure if it is menopause related.     Anthropometrics:  178.8# with shoes    Current diabetes medications:  None, but she has Rx and is waiting for Ozempic to be available      Labs:  Lab Results   Component Value Date    A1C 9.9 (H) 01/30/2024    A1C 6.5 (H) 03/04/2017    CHOLEST 315 (H) 01/30/2024    CHOLEST 274 (H) 02/24/2017     (H) 01/30/2024     (H) 02/24/2017    HDL 67 01/30/2024    HDL 58 02/24/2017    NONHDLC 248 (H) 01/30/2024    NONHDLC 216 (H) 02/24/2017    TRIG 184 (H) 01/30/2024    TRIG 122 02/24/2017    BUN 11 01/30/2024    BUN 9 02/24/2017    CREATSERUM 0.72 01/30/2024    CREATSERUM 0.77 02/24/2017    GFRNAA 91 02/24/2017    GFRNAA 77 08/14/2014    GFRAA 106 02/24/2017    GFRAA 89 08/14/2014       SMBG at home: yes, pt has a Freestyle Satish but is not wearing it right now.  She she has one more sensor which she says she will start today.  She also agrees to enter meals and snacks in the Satish under notes.      Diet Hx:  (a.m.) 4:30am Lemon water,   Later on- cup of coffee with flavored coffee creamer or honey  Breakfast:  Cup of oatmeal from [employer]  Lunch:  Big Salad from [employer] splits with coworker, or brings a lunch  Dinner:  Does cook some nights, Beans made with turkey neck, greens for a salad.  If she gets home late, may just drink a protein shake  (snacks)  fruit, used to have donut on occasion  (fluids) OJ on occasion      Physical Activity: 4 days per week- she walks and does stairs all shift at work, may join her neighborhood fitness ctr and/or start  Walking on her off days      Nutrition Diagnosis  Intake: inconsistent carbohydrate intake  Behavioral and environmental: nutrition and nutrition-related knowledge deficit      Intervention  Comprehensive Nutrition Education Provided:     [x] discussed healthy weight management, glucose management, lipid management, and BP management as related to diabetes   [x] discussed basic meal planning guidelines for diabetes regular mealtime, limited concentrated sweets. Worked on establishing eating pattern/timing of meals and snacks    [x] discussed in depth meal planning using the healthy eating with diabetes plate method with focus on balanced macronutrient consumption, including identifying foods that are carbohydrates, lean protein, non-starchy vegetables, and heart healthy fats   [] stressed importance of carbohydrate consistency in each meal   [] recommended carbohydrate targets of  grams at meals and  grams at snacks >>  will do at follow up visit   [] educated on label reading   [] educated on portion control; including use of measuring cups, spoons, or food scale   [] provided suggestions for lower carb, healthy snacks   [x] educated on importance of food monitoring and how to use food logs   [] discussed how to handle special occasions, dining out, and eating on the go   [] discussed menu planning, healthy food shopping, cooking tips, and need to pre prepare foods    [] educated on emotional eating and being mindful at meals   []  reviewed other behavior modification strategies    [x]emphasized the need for small, sustainable changes and working on SMART goals to encourage sustainable behaviors    [] instructions on how to use helpful websites or nutrition/tracking apps reviewed    [] taught to use carbohydrate to  insulin ratio and insulin sensitivity factor    [] discussed barriers and overcoming barriers to best achieve meal planning goals    [] discussed Mediterranean diet/DASH diet, as appropriate, to address lipids or BP   [] reviewed renal diet components and how to incorporate this with diabetes meal planning     Education on Increased Physical Activity:  discussed how increased physical activity improves insulin resistance, blood glucose control, and heart health    Monitoring  diet modification/understanding, blood sugars, hemoglobin A1c, and weight trends    Evaluation  Behavioral Goal(s) selected:   Healthy Eating and Monitoring Blood Glucose    Support Plan:  The use of Diabetes Websites or Apps    Plan  Blood sugar goals: less than 130 mg/dl in the morning and less than 180 mg/dl 2 hours after meals.  Keep glucose tabs or fast acting carbohydrates with you for treatment of lows; for blood glucose levels less than 70 mg/dl, take 15 grams of fast acting carbohydrates (3-4 glucose tabs, 4 ounces of juice, or as discussed). Retest in 15 min. If not above 70 mg/dl, retreat.   Call Pebbles Paulino RD at [phone] (option 3) for problems/concerns.   Follow up set in 2 weeks.    Pebbles Paulino RD

## 2024-03-11 ENCOUNTER — OFFICE VISIT (OUTPATIENT)
Dept: ENDOCRINOLOGY CLINIC | Facility: CLINIC | Age: 54
End: 2024-03-11

## 2024-03-11 VITALS
BODY MASS INDEX: 31.54 KG/M2 | DIASTOLIC BLOOD PRESSURE: 83 MMHG | HEIGHT: 63 IN | SYSTOLIC BLOOD PRESSURE: 136 MMHG | WEIGHT: 178 LBS | HEART RATE: 73 BPM

## 2024-03-11 DIAGNOSIS — E11.65 UNCONTROLLED TYPE 2 DIABETES MELLITUS WITH HYPERGLYCEMIA (HCC): Primary | ICD-10-CM

## 2024-03-11 LAB
CARTRIDGE LOT#: ABNORMAL NUMERIC
GLUCOSE BLOOD: 246
HEMOGLOBIN A1C: 11.4 % (ref 4.3–5.6)
TEST STRIP LOT #: NORMAL NUMERIC

## 2024-03-11 PROCEDURE — 99204 OFFICE O/P NEW MOD 45 MIN: CPT

## 2024-03-11 PROCEDURE — 82947 ASSAY GLUCOSE BLOOD QUANT: CPT

## 2024-03-11 PROCEDURE — 83036 HEMOGLOBIN GLYCOSYLATED A1C: CPT

## 2024-03-11 RX ORDER — TIRZEPATIDE 5 MG/.5ML
5 INJECTION, SOLUTION SUBCUTANEOUS WEEKLY
Qty: 2 ML | Refills: 3 | Status: SHIPPED | OUTPATIENT
Start: 2024-04-11

## 2024-03-11 RX ORDER — BLOOD-GLUCOSE SENSOR
1 EACH MISCELLANEOUS
Qty: 2 EACH | Refills: 5 | Status: SHIPPED | OUTPATIENT
Start: 2024-03-11

## 2024-03-11 RX ORDER — TIRZEPATIDE 5 MG/.5ML
5 INJECTION, SOLUTION SUBCUTANEOUS WEEKLY
Qty: 2 ML | Refills: 3 | Status: SHIPPED | OUTPATIENT
Start: 2024-04-11 | End: 2024-03-11

## 2024-03-11 RX ORDER — TIRZEPATIDE 2.5 MG/.5ML
2.5 INJECTION, SOLUTION SUBCUTANEOUS WEEKLY
Qty: 2 ML | Refills: 0 | Status: SHIPPED | OUTPATIENT
Start: 2024-03-11 | End: 2024-03-11

## 2024-03-11 RX ORDER — SIMVASTATIN 20 MG
20 TABLET ORAL NIGHTLY
Qty: 90 TABLET | Refills: 1 | Status: SHIPPED | OUTPATIENT
Start: 2024-03-11

## 2024-03-11 RX ORDER — TIRZEPATIDE 2.5 MG/.5ML
2.5 INJECTION, SOLUTION SUBCUTANEOUS WEEKLY
Qty: 2 ML | Refills: 0 | Status: SHIPPED | OUTPATIENT
Start: 2024-03-11

## 2024-03-11 NOTE — PATIENT INSTRUCTIONS
Start Mounjaro 2.5mg subcutaneous weekly for 4 weeks and then increase to 5mg subcutaneous weekly       Continue Metformin 500mg twice daily      Restart Freestyle Satish 3    Restart Simvastatin 20mg once nightly for cholesterol

## 2024-03-11 NOTE — PROGRESS NOTES
Name: Tino Hoff  Date: 3/11/2024    Referring Physician: Robe Serna    HISTORY OF PRESENT ILLNESS   Tino Hoff is a 53 year old female who presents for consult for diabetes mellitus       Diabetes History:  Diagnosed- 7-8 years ago. Had long history of prediabetes prior to this.     FH DM  - sister -  from lung cancer.     Prior glycohemoglobin were -11.4% POC today   Glucose in clinic today -246 mg/dl     Dietary compliance: Fair- improving. She did meet with dietician with the [facility] 3/2/2024- has variable work schedule. Admits to eating potatoes frequently      Recall:  Breakfast- oatmeal with berries and nuts   Lunch-salads  or occ burger with fries,   Dinner- Turkey, greens, beans, potatoes, salad, salon   Snack- occ bag of chips, or nuts   Beverages- 8-10 bottles of water daily, ICE flavored water. Avoids soda. Very rarely glass of OJ       Exercise: Walking frequently at work and outside of work. Has exercise machines at home- will start using     Polyuria/polydipsia: Yes- polyuria, occ. Polydipsia   Blurred vision: Yes    Episodes of hypoglycemia: No  Blood Glucose:  Checking 3 times per day  Uses freestyle Satish is off currently and not checking manually       Current DM Regimen:  Metformin 500mg PO BID    Ozempic- has not yet started due to backorder with medication she was not able to get this medication.       Modifying factors:  Medication adherence: Yes- occ. Missing evening dose   Recent steroids, illness or infections: No       REVIEW OF SYSTEMS  Eyes: Diabetic retinopathy present: None known             Most recent visit to eye doctor in last 12 months: No- has been a few years since last optho visit.     CV: Cardiovascular disease present: No         Hypertension present: Yes         Hyperlipidemia present: Yes         Peripheral Vascular Disease present: No    : Nephropathy present: No    Neuro: Neuropathy present: No, denies symptoms    Skin: Infection or ulceration:  No    Osteoporosis: No    Thyroid disease: No      Medications:     Current Outpatient Medications:     Continuous Blood Gluc Sensor (FREESTYLE LEONARDO 3 SENSOR) Does not apply Misc, 1 each every 14 (fourteen) days., Disp: 2 each, Rfl: 5    simvastatin 20 MG Oral Tab, Take 1 tablet (20 mg total) by mouth nightly., Disp: 90 tablet, Rfl: 1    Continuous Blood Gluc Sensor (FREESTYLE LEONARDO 3 SENSOR) Does not apply Misc, 1 each every 14 (fourteen) days., Disp: 2 each, Rfl: 5    Tirzepatide (MOUNJARO) 2.5 MG/0.5ML Subcutaneous Solution Pen-injector, Inject 2.5 mg into the skin once a week., Disp: 2 mL, Rfl: 0    [START ON 4/11/2024] Tirzepatide (MOUNJARO) 5 MG/0.5ML Subcutaneous Solution Pen-injector, Inject 5 mg into the skin once a week., Disp: 2 mL, Rfl: 3    cloNIDine 0.2 MG/24HR Transdermal Patch Weekly, Place 1 patch onto the skin once a week., Disp: 12 patch, Rfl: 1    losartan 50 MG Oral Tab, Take 1 tablet (50 mg total) by mouth daily., Disp: 90 tablet, Rfl: 1    metFORMIN  MG Oral Tablet 24 Hr, Take 1 tablet (500 mg total) by mouth 2 (two) times daily., Disp: 180 tablet, Rfl: 0    amLODIPine 10 MG Oral Tab, Take 1 tablet (10 mg total) by mouth daily., Disp: 90 tablet, Rfl: 0    Multiple Vitamin (MULTIVITAMIN ADULT OR), Take by mouth daily., Disp: , Rfl:     Continuous Blood Gluc  (FREESTYLE LEONARDO 2 READER) Does not apply Device, 1 Stick TID AC&HS., Disp: 1 each, Rfl: 0    Continuous Blood Gluc Sensor (FREESTYLE LEONARDO 2 SENSOR) Does not apply Misc, 1 Stick TID AC&HS., Disp: 1 each, Rfl: 11    levocetirizine 5 MG Oral Tab, Take 1 tablet (5 mg total) by mouth every evening. (Patient not taking: Reported on 1/30/2024), Disp: 90 tablet, Rfl: 1    diphenhydrAMINE 25 MG Oral Cap, Take 25 mg by mouth. (Patient not taking: Reported on 1/30/2024), Disp: , Rfl:     Fexofenadine-Pseudoephed ER  MG Oral Tablet 12 Hr, Take 1 tablet by mouth 2 (two) times daily. (Patient not taking: Reported on 1/30/2024),  Disp: 30 tablet, Rfl: 0     Allergies:   No Known Allergies    Social History:   Social History     Socioeconomic History    Marital status: Single   Tobacco Use    Smoking status: Never    Smokeless tobacco: Never   Substance and Sexual Activity    Alcohol use: Yes     Comment: socially; 4 drinks per month   Other Topics Concern    Caffeine Concern Yes     Comment: coffee       Medical History:   No past medical history on file.    Surgical history:   Past Surgical History:   Procedure Laterality Date    HYSTERECTOMY  2007         PHYSICAL EXAM  Vitals:    03/11/24 1041   BP: 136/83   Pulse: 73   Weight: 178 lb (80.7 kg)   Height: 5' 3\" (1.6 m)       General Appearance:  alert, well developed, in no acute distress  Eyes:  normal conjunctivae, sclera, and normal pupils  Neck: Trachea midline: Normal  Back: no kyphosis or back tenderness  Respiratory:  clear to auscultation bilaterally  Lymph Nodes:  No abnormal nodes noted  Musculoskeletal:  normal muscle strength and tone  Skin:  normal moisture and skin texture  Hair & Nails:  normal scalp hair     Hematologic:  no excessive bruising  Neuro:  sensory grossly intact and motor grossly intact.  Psychiatric:  oriented to time, self, and place  Nutritional:  no abnormal weight gain or loss    -foot exam deferred to next visit.     ASSESSMENT/PLAN:    Diabetes mellitus type 2 uncontrolled  -HgA1c- 11.4%  -Reviewed ABC\"s of diabetes   - Reviewed pathogenesis of diabetes.   - Reviewed importance of good glycemic control to prevent microvascular and macrovascular complications including nephropathy, neuropathy, retinopathy, and cardiovascular disease.  - Reviewed importance of SBGM- check glucose 3 times daily   - Reviewed target glucose goals for patient   fasting and >180 post prandially   - Reviewed importance of following diabetic diet- recommended 135 grams of CHO per day or 45 grams per meal.   - Provided patient education materials    -Continue with [facility] follow  up appointments as discussed and continue to work on diet     -Reviewed recommendations for exercise- reviewed target of 30 minutes daily, 5 days per week.     - Start Freestyle Satish 3- prescription sent and sample provided.   - Start Mounjaro 2.5mg subcutaneous weekly for 4 weeks and then increase to 5mg subcutaneous weekly. Reviewed pen injection and dose titration in clinic today     -Continue Metformin 1000mg PO BID.     - Discussed SGLT-2 inhibitor. Will add if unable to get Mounjaro due to backorder/supply issue or will add if glucose levels not at target with Mounjaro alone.     -Reviewed glucose targets.    - normotensive  - Lipids significantly above goal- LDL -212. Start atorvastatin 20mg PO nightly, repeat lipids in 3 months   - No nephropathy- on Losartan 50mg daily   -Reviewed need for yearly optho follow up   - Reviewed foot care and daily foot exam     Return in about 6 weeks (around 4/22/2024).  3/11/2024  KEILY Lal      A total of  40 minutes was spent on obtaining history, reviewing pertinent imaging/labs and specialists notes, evaluating patient, providing multiple treatment options, reinforcing diet/exercise and compliance, and completing documentation.

## 2024-03-14 ENCOUNTER — TELEPHONE (OUTPATIENT)
Dept: GASTROENTEROLOGY | Facility: CLINIC | Age: 54
End: 2024-03-14

## 2024-03-14 ENCOUNTER — OFFICE VISIT (OUTPATIENT)
Dept: GASTROENTEROLOGY | Facility: CLINIC | Age: 54
End: 2024-03-14

## 2024-03-14 VITALS — BODY MASS INDEX: 31.54 KG/M2 | WEIGHT: 178 LBS | HEIGHT: 63 IN

## 2024-03-14 DIAGNOSIS — Z12.11 COLON CANCER SCREENING: Primary | ICD-10-CM

## 2024-03-14 PROCEDURE — S0285 CNSLT BEFORE SCREEN COLONOSC: HCPCS | Performed by: INTERNAL MEDICINE

## 2024-03-14 RX ORDER — CYCLOBENZAPRINE HCL 10 MG
10 TABLET ORAL 2 TIMES DAILY
COMMUNITY
Start: 2024-02-26

## 2024-03-14 NOTE — H&P
[facility] - Gastroenterology                                                                                                               Reason for consult: CRC screening    Requesting physician or provider: Robe Serna DO    Chief Complaint   Patient presents with    Colonoscopy Screening       HPI:   Tino Hoff is a 53 year old year-old female with history of DM-II, HTN here for the following:    Pt is here to discuss her options for CRC screening.      Patient currently denies any GI symptoms of nausea, vomiting, dyspepsia, dysphagia, hematemesis, abdominal pain, change in bowel habits, thin stools, hematochezia, or melena.  Additionally there is no weight loss and no reported history of chest pain or shortness of breath.    Denies family h/o CRC.     Prior endoscopies:  None.     Soc:  -denies smoking  -denies heavy Etoh  -no illicit drug use    Wt Readings from Last 6 Encounters:   03/14/24 178 lb (80.7 kg)   03/11/24 178 lb (80.7 kg)   03/02/24 178 lb 12.8 oz (81.1 kg)   01/30/24 180 lb (81.6 kg)   08/18/22 180 lb 6.4 oz (81.8 kg)   02/17/17 180 lb (81.6 kg)        History, Medications, Allergies, ROS:      History reviewed. No pertinent past medical history.   Past Surgical History:   Procedure Laterality Date    HYSTERECTOMY  2007      Family Hx:   Family History   Problem Relation Age of Onset    Stroke Mother     Cancer Sister         lung      Social History:   Social History     Socioeconomic History    Marital status: Single   Tobacco Use    Smoking status: Never    Smokeless tobacco: Never   Substance and Sexual Activity    Alcohol use: Yes     Comment: socially; 4 drinks per month   Other Topics Concern    Caffeine Concern Yes     Comment: coffee        Medications (Active prior to today's visit):  Current Outpatient Medications   Medication Sig Dispense Refill    polyethylene glycol, PEG  3350-KCl-NaBcb-NaCl-NaSulf, 236 g Oral Recon Soln Take 4,000 mL by mouth once for 1 dose. As directed by GI clinic instructions. 4000 mL 0    Continuous Blood Gluc Sensor (FREESTYLE LEONARDO 3 SENSOR) Does not apply Misc 1 each every 14 (fourteen) days. 2 each 5    simvastatin 20 MG Oral Tab Take 1 tablet (20 mg total) by mouth nightly. 90 tablet 1    Continuous Blood Gluc Sensor (FREESTYLE LEONARDO 3 SENSOR) Does not apply Misc 1 each every 14 (fourteen) days. 2 each 5    Tirzepatide (MOUNJARO) 2.5 MG/0.5ML Subcutaneous Solution Pen-injector Inject 2.5 mg into the skin once a week. 2 mL 0    cloNIDine 0.2 MG/24HR Transdermal Patch Weekly Place 1 patch onto the skin once a week. 12 patch 1    losartan 50 MG Oral Tab Take 1 tablet (50 mg total) by mouth daily. 90 tablet 1    metFORMIN  MG Oral Tablet 24 Hr Take 1 tablet (500 mg total) by mouth 2 (two) times daily. 180 tablet 0    amLODIPine 10 MG Oral Tab Take 1 tablet (10 mg total) by mouth daily. 90 tablet 0    Multiple Vitamin (MULTIVITAMIN ADULT OR) Take by mouth daily.      levocetirizine 5 MG Oral Tab Take 1 tablet (5 mg total) by mouth every evening. 90 tablet 1    diphenhydrAMINE 25 MG Oral Cap Take 1 capsule (25 mg total) by mouth.      Continuous Blood Gluc  (FREESTYLE LEONARDO 2 READER) Does not apply Device 1 Stick TID AC&HS. 1 each 0    Continuous Blood Gluc Sensor (FREESTYLE LEONARDO 2 SENSOR) Does not apply Misc 1 Stick TID AC&HS. 1 each 11    cyclobenzaprine 10 MG Oral Tab Take 1 tablet (10 mg total) by mouth 2 (two) times daily. (Patient not taking: Reported on 3/14/2024)      [START ON 4/11/2024] Tirzepatide (MOUNJARO) 5 MG/0.5ML Subcutaneous Solution Pen-injector Inject 5 mg into the skin once a week. 2 mL 3    Fexofenadine-Pseudoephed ER  MG Oral Tablet 12 Hr Take 1 tablet by mouth 2 (two) times daily. (Patient not taking: Reported on 3/14/2024) 30 tablet 0       Allergies:  No Known Allergies    ROS:   CONSTITUTIONAL:  negative for  fevers, rigors  EYES:  negative for diplopia   RESPIRATORY:  negative for severe shortness of breath  CARDIOVASCULAR:  negative for crushing sub-sternal chest pain  GASTROINTESTINAL:  see HPI  GENITOURINARY:  negative for dysuria or gross hematuria  INTEGUMENT/BREAST:  SKIN:  negative for jaundice   ALLERGIC/IMMUNOLOGIC:  negative for hay fever  ENDOCRINE:  negative for cold intolerance and heat intolerance  MUSCULOSKELETAL:  negative for joint effusion/severe erythema  BEHAVIOR/PSYCH:  negative for psychotic behavior      PHYSICAL EXAM:   Height 5' 3\" (1.6 m), weight 178 lb (80.7 kg), not currently breastfeeding.    GEN - Patient appears comfortable and in no acute discomfort  ENT - MMM  EYES - the sclera appears anicteric  CV - no edema  RESP -  No increased work of breathing  ABDOMEN - soft, non-tender exam in all quadrants without rigidity or guarding, non-distended, no abnormal bowel sounds noted, no masses are palpated  SKIN - No jaundice  NEURO - Alert and appropriate, and gross movements of extremities normal  PSYCH - normal affect, non-agitated      Labs/Imaging:     Patient's pertinent labs and imaging were reviewed and discussed with patient today.      .  ASSESSMENT/PLAN:       Tino Hoff is a 53 year old year-old female with history of DM-II, HTN here for the following:    Average risk CRC screening: patient is considered average risk for colon cancer, and it is appropriate to proceed with screening colonoscopy. Patient is currently asymptomatic and denies diarrhea, hematochezia, thin-stools or weight loss. We discussed risks/benefits/alternatives to procedure, including CT colonography and stool testing, and she would like to proceed with a colonoscopy.    Recommend:  -Schedule colonoscopy w/ MAC  -Prep: split dose golytely  -Anti-platelets and anti-coagulants: none  -Diabetes and hypertensive meds   - hold metformin the day before the day of the procedure   - hold mounjaro for 1 week before the  procedure    Colonoscopy consent: I have discussed the risks, benefits, and alternatives to colonoscopy with the patient [who demonstrated understanding], including but not limited to the risks of bleeding, infection, pain, death, as well as the risks of anesthesia and perforation all leading to prolonged hospitalization, surgical intervention, or even death. I also specifically mentioned the miss rate of colonoscopy of 5-10% in the best of all circumstances. All questions were answered to the patient’s satisfaction. The patient signed informed consent and elected to proceed with colonoscopy with intervention [i.e. polypectomy, stent placement, etc.] as indicated.          Orders This Visit:  No orders of the defined types were placed in this encounter.      Meds This Visit:  Requested Prescriptions     Signed Prescriptions Disp Refills    polyethylene glycol, PEG 3350-KCl-NaBcb-NaCl-NaSulf, 236 g Oral Recon Soln 4000 mL 0     Sig: Take 4,000 mL by mouth once for 1 dose. As directed by GI clinic instructions.       Imaging & Referrals:  None         Lori Dennison MD          This note was partially prepared using Dragon [employer] voice recognition dictation software. As a result, errors may occur. When identified, these errors have been corrected. While every attempt is made to correct errors during dictation, discrepancies may still exist.

## 2024-03-14 NOTE — PATIENT INSTRUCTIONS
1. Schedule colonoscopy with MAC [Diagnosis: CRC screening]    2.  bowel prep from pharmacy (split dose golytely)    3. Medication adjustment:       A. Hold metformin the day before and the day of the procedure     B. Hold Mounjaro for 1 week before the procedures    4. Read all bowel prep instructions carefully    5. AVOID seeds, nuts, popcorn, raw fruits and vegetables (cooked is okay) for 2-3 days before procedure    6. You will need to go for COVID testing 72 hours before procedure. The testing team will call you a few days before your procedure to notify you where/when you can get COVID testing. If you do not go for COVID testing, the procedure cannot be performed.     7. If you start any NEW medication after your visit today, please notify us. Certain medications will need to be held before the procedure, or the procedure cannot be performed.

## 2024-03-14 NOTE — TELEPHONE ENCOUNTER
Scheduled for: Colonoscopy 61094   Provider Name: Dr Dennison   Date: Mon 7/29/2024  Location: [facility]   Sedation:  MAC  Time: 12:30 pm, (pt is aware that [facility] will call the day before to confirm arrival time)  Prep: split golytely  Meds/Allergies Reconciled?: NKDA   Diagnosis with codes: colon screening Z12.11   Was patient informed to call insurance with codes (Y/N): Yes   Referral sent?: Yes, [employer]   [facility] or [facility] notified?: Electronic case request was sent to [facility] via [facility].      Medication Orders: A. Hold metformin the day before and the day of the procedure  B. Hold Mounjaro for 1 week before the procedures  Patient is aware to NOT take iron pills, herbal meds and diet supplements for 7 days before exam. Also to NOT take any form of alcohol, recreational drugs and any forms of ED meds 24-72 hours before exam.      Misc Orders:       Further instructions given by staff:  Instructions given in office and pt verbalized understanding          Instructions    1. Schedule colonoscopy with MAC [Diagnosis: CRC screening]     2.  bowel prep from pharmacy (split dose golytely)     3. Medication adjustment:        A. Hold metformin the day before and the day of the procedure     B. Hold Mounjaro for 1 week before the procedures

## 2024-03-18 PROBLEM — J06.9 URI (UPPER RESPIRATORY INFECTION): Status: RESOLVED | Noted: 2017-02-17 | Resolved: 2024-03-18

## 2024-03-21 ENCOUNTER — HOSPITAL ENCOUNTER (OUTPATIENT)
Dept: MAMMOGRAPHY | Age: 54
Discharge: HOME | End: 2024-03-21
Attending: FAMILY MEDICINE
Payer: COMMERCIAL

## 2024-03-21 DIAGNOSIS — Z12.31 ENCOUNTER FOR SCREENING MAMMOGRAM FOR BREAST CANCER: ICD-10-CM

## 2024-03-21 PROCEDURE — 77067 SCR MAMMO BI INCL CAD: CPT | Performed by: FAMILY MEDICINE

## 2024-03-21 PROCEDURE — 77063 BREAST TOMOSYNTHESIS BI: CPT | Performed by: FAMILY MEDICINE

## 2024-03-25 ENCOUNTER — TELEPHONE (OUTPATIENT)
Dept: FAMILY MEDICINE CLINIC | Facility: CLINIC | Age: 54
End: 2024-03-25

## 2024-03-25 NOTE — TELEPHONE ENCOUNTER
Patient called office. Date of birth and full name both confirmed.   Says someone told her to complete follow-up testing   RN informed of provider's message as detailed .   She would like to have it done today - advised that that might not be feasible, but will transfer to central scheduling.   RN also provided number for central scheduling     Future Appointments   Date Time [facility] Department [place]   4/22/2024 10:30 AM Wendy Ceja APRN [facility]OP[facility]ENDO [facility]   7/29/2024 12:30 PM RADHA, PROCEDURE ECCF[facility]IPROC None           [facility] DEE DEE 2D+3D SCREENING BILAT (CPT=77067/68182): Result Notes     Robe Serna DO  3/23/2024  8:44 AM CDT       Please let this patient know that her mammogram has been read as incomplete.  This is not necessarily a bad reading.  This does indicate that additional or comparison views are necessary to give a confident reading.  I am sure that the breast clinic has notified the patient of this, but I need to know that the patient is informed.  Thank you.       Future Appointments   Date Time [facility] Department [place]   3/29/2024 11:00 AM [facility] RM5 [facility] EM [facility]   4/22/2024 10:30 AM Wendy Ceja APRN ECOPOENDO [facility]   7/29/2024 12:30 PM RADHA, PROCEDURE ECCF[facility]IPROC None

## 2024-03-29 ENCOUNTER — HOSPITAL ENCOUNTER (OUTPATIENT)
Dept: MAMMOGRAPHY | Facility: HOSPITAL | Age: 54
Discharge: HOME | End: 2024-03-29
Attending: FAMILY MEDICINE
Payer: COMMERCIAL

## 2024-03-29 DIAGNOSIS — R92.8 ABNORMAL MAMMOGRAM: ICD-10-CM

## 2024-03-29 PROCEDURE — 77065 DX MAMMO INCL CAD UNI: CPT | Performed by: FAMILY MEDICINE

## 2024-03-29 PROCEDURE — 77061 BREAST TOMOSYNTHESIS UNI: CPT | Performed by: FAMILY MEDICINE

## 2024-04-06 NOTE — TELEPHONE ENCOUNTER
LOV: 03/11/2024     Next office visit: 04/22/2024     Last filled: 03/11/2024       Order pended and routed to provider

## 2024-04-08 RX ORDER — TIRZEPATIDE 2.5 MG/.5ML
2.5 INJECTION, SOLUTION SUBCUTANEOUS WEEKLY
Refills: 0 | OUTPATIENT
Start: 2024-04-08

## 2024-04-24 RX ORDER — TIRZEPATIDE 5 MG/.5ML
5 INJECTION, SOLUTION SUBCUTANEOUS WEEKLY
Qty: 2 ML | Refills: 3 | Status: SHIPPED | OUTPATIENT
Start: 2024-04-24 | End: 2024-04-24

## 2024-04-24 NOTE — TELEPHONE ENCOUNTER
Endocrine Refill protocol for oral and injectable diabetic medications    Protocol Criteria:    -Appointment with Endocrinology completed in the last 6 months or scheduled in the next 3 months    -A1c result <8.5% in the past 6 months      Verify the above has been completed or scheduled in the appropriate timeline. If so can send a 90 day supply with 1 refill.     Last completed office visit:03/11/24  Next scheduled Follow up: 05/20/24  Last A1c result: 11.4

## 2024-04-25 NOTE — TELEPHONE ENCOUNTER
Endocrine Refill protocol for oral and injectable diabetic medications    Protocol Criteria:    -Appointment with Endocrinology completed in the last 6 months or scheduled in the next 3 months    -A1c result <8.5% in the past 6 months      Verify the above has been completed or scheduled in the appropriate timeline. If so can send a 90 day supply with 1 refill.     Last completed office visit: 03/11/24  Next scheduled Follow up: 05/20/24  Last A1c result: Last A1c value was 11.4% done 3/11/2024.      Patient is requesting her lab results from 6/16/17 sent to her via letter

## 2024-04-26 RX ORDER — TIRZEPATIDE 5 MG/.5ML
5 INJECTION, SOLUTION SUBCUTANEOUS WEEKLY
Qty: 2 ML | Refills: 3 | Status: SHIPPED | OUTPATIENT
Start: 2024-04-26

## 2024-05-13 RX ORDER — TIRZEPATIDE 5 MG/.5ML
5 INJECTION, SOLUTION SUBCUTANEOUS WEEKLY
Qty: 2 ML | Refills: 3 | Status: SHIPPED | OUTPATIENT
Start: 2024-05-13

## 2024-05-20 ENCOUNTER — OFFICE VISIT (OUTPATIENT)
Dept: ENDOCRINOLOGY CLINIC | Facility: CLINIC | Age: 54
End: 2024-05-20

## 2024-05-20 VITALS
HEART RATE: 66 BPM | DIASTOLIC BLOOD PRESSURE: 76 MMHG | BODY MASS INDEX: 31.54 KG/M2 | HEIGHT: 63 IN | WEIGHT: 178 LBS | SYSTOLIC BLOOD PRESSURE: 113 MMHG

## 2024-05-20 DIAGNOSIS — E11.65 UNCONTROLLED TYPE 2 DIABETES MELLITUS WITH HYPERGLYCEMIA (HCC): Primary | ICD-10-CM

## 2024-05-20 LAB
CARTRIDGE LOT#: ABNORMAL NUMERIC
GLUCOSE BLOOD: 141
HEMOGLOBIN A1C: 7.3 % (ref 4.3–5.6)
TEST STRIP LOT #: NORMAL NUMERIC

## 2024-05-20 PROCEDURE — 82947 ASSAY GLUCOSE BLOOD QUANT: CPT

## 2024-05-20 PROCEDURE — 83036 HEMOGLOBIN GLYCOSYLATED A1C: CPT

## 2024-05-20 PROCEDURE — 99214 OFFICE O/P EST MOD 30 MIN: CPT

## 2024-05-20 RX ORDER — SEMAGLUTIDE 0.68 MG/ML
0.5 INJECTION, SOLUTION SUBCUTANEOUS WEEKLY
Qty: 3 ML | Refills: 3 | Status: SHIPPED | OUTPATIENT
Start: 2024-05-20

## 2024-05-20 NOTE — PROGRESS NOTES
Name: Tino Hoff  Date: 24    Referring Physician: No ref. provider found    HISTORY OF PRESENT ILLNESS   Tino Hoff is a 53 year old female who presents for follow up for diabetes mellitus       Diabetes History:  Diagnosed- 7-8 years ago. Had long history of prediabetes prior to this.     FH DM  - sister -  from lung cancer.     Prior glycohemoglobin were -11.4% 3/2024; 7.3% POC today   Glucose in clinic today - 141 mg/dl     Dietary compliance: Fair- improving. She did meet with dietician with the [facility] 3/2/2024- has variable work schedule.     Recall:  Breakfast- oatmeal with berries and nuts   Lunch-salads  or occ burger with fries,   Dinner- Turkey, greens, beans, potatoes, salad, salon   Snack- occ bag of chips, or nuts   Beverages- 8-10 bottles of water daily, ICE flavored water. Avoids soda. Very rarely glass of OJ       Exercise: Walking frequently at work and outside of work. Has increased walking since last visit. Will start weights/toning exercises     Polyuria/polydipsia: No- improved   Blurred vision: No    Episodes of hypoglycemia: No  Blood Glucose:  Checking daily     120-180's    Current DM Regimen:  Metformin 500mg PO BID- stopped when she started Mounjaro    Mounjaro 5mg subcutaneous weekly     Modifying factors:  Medication adherence: Yes   Recent steroids, illness or infections: No       REVIEW OF SYSTEMS  Eyes: Diabetic retinopathy present: None known             Most recent visit to eye doctor in last 12 months: No- has been a few years since last optho visit.     CV: Cardiovascular disease present: No         Hypertension present: Yes         Hyperlipidemia present: Yes         Peripheral Vascular Disease present: No    : Nephropathy present: No    Neuro: Neuropathy present: No, denies symptoms    Skin: Infection or ulceration: No    Osteoporosis: No    Thyroid disease: No      Medications:     Current Outpatient Medications:     Tirzepatide (MOUNJARO) 5 MG/0.5ML  Subcutaneous Solution Pen-injector, Inject 5 mg into the skin once a week., Disp: 2 mL, Rfl: 3    cyclobenzaprine 10 MG Oral Tab, Take 1 tablet (10 mg total) by mouth 2 (two) times daily., Disp: , Rfl:     Continuous Blood Gluc Sensor (FREESTYLE LEONARDO 3 SENSOR) Does not apply Misc, 1 each every 14 (fourteen) days., Disp: 2 each, Rfl: 5    simvastatin 20 MG Oral Tab, Take 1 tablet (20 mg total) by mouth nightly., Disp: 90 tablet, Rfl: 1    Continuous Blood Gluc Sensor (FREESTYLE LEONARDO 3 SENSOR) Does not apply Misc, 1 each every 14 (fourteen) days., Disp: 2 each, Rfl: 5    Tirzepatide (MOUNJARO) 2.5 MG/0.5ML Subcutaneous Solution Pen-injector, Inject 2.5 mg into the skin once a week., Disp: 2 mL, Rfl: 0    cloNIDine 0.2 MG/24HR Transdermal Patch Weekly, Place 1 patch onto the skin once a week., Disp: 12 patch, Rfl: 1    losartan 50 MG Oral Tab, Take 1 tablet (50 mg total) by mouth daily., Disp: 90 tablet, Rfl: 1    metFORMIN  MG Oral Tablet 24 Hr, Take 1 tablet (500 mg total) by mouth 2 (two) times daily., Disp: 180 tablet, Rfl: 0    amLODIPine 10 MG Oral Tab, Take 1 tablet (10 mg total) by mouth daily., Disp: 90 tablet, Rfl: 0    Multiple Vitamin (MULTIVITAMIN ADULT OR), Take by mouth daily., Disp: , Rfl:     levocetirizine 5 MG Oral Tab, Take 1 tablet (5 mg total) by mouth every evening., Disp: 90 tablet, Rfl: 1    diphenhydrAMINE 25 MG Oral Cap, Take 1 capsule (25 mg total) by mouth., Disp: , Rfl:     Continuous Blood Gluc  (FREESTYLE LEONARDO 2 READER) Does not apply Device, 1 Stick TID AC&HS., Disp: 1 each, Rfl: 0    Continuous Blood Gluc Sensor (FREESTYLE LEONARDO 2 SENSOR) Does not apply Misc, 1 Stick TID AC&HS., Disp: 1 each, Rfl: 11    Fexofenadine-Pseudoephed ER  MG Oral Tablet 12 Hr, Take 1 tablet by mouth 2 (two) times daily., Disp: 30 tablet, Rfl: 0     Allergies:   No Known Allergies    Social History:   Social History     Socioeconomic History    Marital status: Single   Tobacco Use     Smoking status: Never    Smokeless tobacco: Never   Substance and Sexual Activity    Alcohol use: Yes     Comment: socially; 4 drinks per month   Other Topics Concern    Caffeine Concern Yes     Comment: coffee       Medical History:   No past medical history on file.    Surgical history:   Past Surgical History:   Procedure Laterality Date    Hysterectomy  2007         PHYSICAL EXAM  Vitals:    05/20/24 1018   BP: 113/76   Pulse: 66   Weight: 178 lb (80.7 kg)   Height: 5' 3\" (1.6 m)         General Appearance:  alert, well developed, in no acute distress  Eyes:  normal conjunctivae, sclera, and normal pupils  Neck: Trachea midline: Normal  Back: no kyphosis or back tenderness  Respiratory:  clear to auscultation bilaterally  Lymph Nodes:  No abnormal nodes noted  Musculoskeletal:  normal muscle strength and tone  Skin:  normal moisture and skin texture  Hair & Nails:  normal scalp hair     Hematologic:  no excessive bruising  Neuro:  sensory grossly intact and motor grossly intact.  Psychiatric:  oriented to time, self, and place  Nutritional:  no abnormal weight gain or loss  Feet: Bilateral barefoot skin diabetic exam is normal, visualized feet and the appearance is normal.  Bilateral monofilament/sensation of both feet is normal.  Pulsation pedal pulse exam of both lower legs/feet is normal as well.      ASSESSMENT/PLAN:    Diabetes mellitus type 2 uncontrolled  -HgA1c- 7.3%- significantly improved   -Reviewed ABC's of diabetes   - Reviewed pathogenesis of diabetes.   - Reviewed importance of good glycemic control to prevent microvascular and macrovascular complications including nephropathy, neuropathy, retinopathy, and cardiovascular disease.  - Reviewed importance of SBGM- check glucose 3 times daily   - Reviewed target glucose goals for patient   fasting and >180 post prandially   - Reviewed importance of following diabetic diet- recommended 135 grams of CHO per day or 45 grams per meal.   - Provided  patient education materials    -Continue with MARITZA follow up appointments as discussed and continue to work on diet     -Reviewed recommendations for exercise- reviewed target of 30 minutes daily, 5 days per week.     - She started Freestyle Satish at last visit but had several issues with failed sensors and connection issues.     _She is tolerating Mounjaro but reports now medication is out of stock at her pharmacy and she is unable to get due to backorder of medication.    -Change to Ozempic 0.5mg subcutaneous weekly. Reviewed side effects and risks vs benefits of medication. Reviewed pen injection and dose titration during visit today.     -Reviewed glucose targets.    - normotensive  - Lipids significantly above goal- LDL -212. Started simvastatin 20mg PO     nightly at last visit,  repeat lipids in 3 months   - No nephropathy- on Losartan 50mg daily   - Reviewed need for yearly optho follow up - discussed importance today of scheduling appt.   - Reviewed foot care and daily foot exam, normal exam.     RTC in 3 months   5/20/24  KEILY Lal      A total of  30 minutes was spent on obtaining history, reviewing pertinent imaging/labs and specialists notes, evaluating patient, providing multiple treatment options, reinforcing diet/exercise and compliance, and completing documentation.

## 2024-06-24 RX ORDER — TIRZEPATIDE 5 MG/.5ML
5 INJECTION, SOLUTION SUBCUTANEOUS WEEKLY
Qty: 2 ML | Refills: 3 | Status: SHIPPED | OUTPATIENT
Start: 2024-06-24

## 2024-06-24 NOTE — TELEPHONE ENCOUNTER
Endocrine Refill protocol for oral and injectable diabetic medications    Protocol Criteria:pass    -Appointment with Endocrinology completed in the last 6 months or scheduled in the next 3 months    -A1c result <8.5% in the past 6 months      Verify the above has been completed or scheduled in the appropriate timeline. If so can send a 90 day supply with 1 refill.     Last completed office visit: 5/20/2024 Wendy Ceja APRN   Next scheduled Follow up: 07/08/24     Last A1c result: Last A1c value was 7.3% done 5/20/2024.

## 2024-06-25 RX ORDER — AMLODIPINE BESYLATE 10 MG/1
10 TABLET ORAL DAILY
Qty: 90 TABLET | Refills: 3 | Status: SHIPPED | OUTPATIENT
Start: 2024-06-25

## 2024-06-25 NOTE — TELEPHONE ENCOUNTER
Refill Per Protocol     Requested Prescriptions   Pending Prescriptions Disp Refills    amLODIPine 10 MG Oral Tab 90 tablet 0     Sig: Take 1 tablet (10 mg total) by mouth daily.       Hypertension Medications Protocol Passed - 6/22/2024  8:08 PM        Passed - CMP or BMP in past 12 months        Passed - Last BP reading less than 140/90     BP Readings from Last 1 Encounters:   05/20/24 113/76               Passed - In person appointment or virtual visit in the past 12 mos or appointment in next 3 mos     Recent Outpatient Visits              1 month ago Uncontrolled type 2 diabetes mellitus with hyperglycemia (HCC)    [facility] Wendy Ceja APRN    Office Visit    3 months ago Colon cancer screening    [facility] Lori Garcia MD    Office Visit    3 months ago Uncontrolled type 2 diabetes mellitus with hyperglycemia (HCC)    [facility] Wendy Ceja APRN    Office Visit    4 months ago Encounter for annual physical exam    [facility] Robe Serna DO    Office Visit    1 year ago Environmental and seasonal allergies    [facility]    Nurse Only          Future Appointments         Provider Department Appt Notes    In 1 week Wendy Ceja APRN [facility]     In 1 month [facility]                    Passed - EGFRCR or GFRAA > 50     GFR Evaluation  EGFRCR: 99 , resulted on 1/30/2024                 Future Appointments         Provider Department Appt Notes    In 1 week Wendy Ceja APRN [facility]     In 1 month [facility]           Recent Outpatient Visits              1 month ago Uncontrolled type 2 diabetes mellitus with hyperglycemia (HCC)    [facility] Wendy Ceja APRN    Office Visit    3 months ago Colon cancer screening    [facility] Lori Dennison MD    Office Visit    3 months ago Uncontrolled type 2 diabetes mellitus with hyperglycemia (HCC)    [facility] Wendy Ceja APRN    Office Visit    4 months ago Encounter for annual physical exam    [facility] Robe Serna DO    Office Visit    1 year ago Environmental and seasonal allergies    [facility]    Nurse Only

## 2024-07-08 NOTE — TELEPHONE ENCOUNTER
Endocrine Refill protocol for oral and injectable diabetic medications    Protocol Criteria: PASS    -Appointment with Endocrinology completed in the last 6 months or scheduled in the next 3 months    -A1c result below 8.5% in the past 6 months      Verify the above has been completed or scheduled in the appropriate timeline. If so can send a 90 day supply with 1 refill.     Last completed office visit: 5/20/2024 Wendy Ceja, KEILY   Next scheduled Follow up: No follow-up appointment scheduled  Future Appointments   Date Time Provider Department Center   7/29/2024 12:30 PM RADHA, PROCEDURE [id number] None      Last A1c result: Last A1c value was 7.3% done 5/20/2024.

## 2024-07-09 RX ORDER — AMLODIPINE BESYLATE 10 MG/1
10 TABLET ORAL DAILY
Qty: 90 TABLET | Refills: 3 | OUTPATIENT
Start: 2024-07-09

## 2024-07-09 RX ORDER — SEMAGLUTIDE 0.68 MG/ML
0.5 INJECTION, SOLUTION SUBCUTANEOUS WEEKLY
Qty: 3 ML | Refills: 1 | Status: SHIPPED | OUTPATIENT
Start: 2024-07-09

## 2024-07-22 RX ORDER — AMLODIPINE BESYLATE 10 MG/1
10 TABLET ORAL DAILY
Qty: 90 TABLET | Refills: 3 | OUTPATIENT
Start: 2024-07-22

## 2024-07-29 ENCOUNTER — TELEPHONE (OUTPATIENT)
Dept: GASTROENTEROLOGY | Facility: CLINIC | Age: 54
End: 2024-07-29

## 2024-07-29 NOTE — TELEPHONE ENCOUNTER
GI staff: please place recall for colonoscopy in 1 year with an extended bowel prep with dulcolax for three days before the procedure along with a split dose prep and other standard diet restrictions.  The patient had solid food the afternoon prior to the last attempt.

## 2024-07-29 NOTE — TELEPHONE ENCOUNTER
GI staff: please place recall for colonoscopy in 1 year with an extended bowel prep with dulcolax for three days before the procedure along with a split dose prep and other standard diet restrictions.  The patient had solid food the afternoon prior to the last attempt.          Health maintenance update and message sent to patient outreach to repeat 1 year.

## 2024-09-03 RX ORDER — SIMVASTATIN 20 MG
20 TABLET ORAL NIGHTLY
Qty: 90 TABLET | Refills: 1 | Status: SHIPPED | OUTPATIENT
Start: 2024-09-03

## 2024-09-03 NOTE — TELEPHONE ENCOUNTER
Endocrine refill protocol for lipid lowering medications    Protocol Criteria:  PASSED Reason: N/A  Appointment with Endocrinology completed in the last 6 months or scheduled in the next 3 months     Verify appointment has been completed or scheduled in the appropriate timeline. If so can send a 90 day supply with 1 refill.   Lipid panel must have been completed in the last 12 months   ALT result below 80  LDL result below 130    Last completed office visit:5/20/2024 Wendy Ceja APRN   Next scheduled Follow up: No future appointments. [facility] sent  Last Lipid panel date: Cholesterol: 315, done on 1/30/2024.  HDL Cholesterol: 67, done on 1/30/2024.  TriGlycerides 184, done on 1/30/2024.  LDL Cholesterol: 212, done on 1/30/2024.     Last ALT result: Last ALT was 29 done on 1/30/2024.  Last AST was 14 done on 1/30/2024.

## 2024-10-23 RX ORDER — SEMAGLUTIDE 0.68 MG/ML
0.5 INJECTION, SOLUTION SUBCUTANEOUS WEEKLY
Qty: 9 ML | Refills: 1 | Status: CANCELLED | OUTPATIENT
Start: 2024-10-23

## 2024-10-23 NOTE — TELEPHONE ENCOUNTER
Jose sent to patient to confirm if she is taking Mounjaro or Ozempic. Also sent message to schedule appt.     Endocrine Refill protocol for oral and injectable diabetic medications    Protocol Criteria:  PASSED    If all below requirements are met, send a 90-day supply with 1 refill per provider protocol.    Verify appointment with Endocrinology completed in the last 6 months or scheduled in the next 3 months.  Verify A1C has been completed within the last 6 months and is below 8.5%     Last completed office visit: 5/20/2024 Wendy Ceja APRN   Next scheduled Follow up:   Last A1c result: Last A1c value was 7.3% done 5/20/2024.

## 2024-11-11 ENCOUNTER — OFFICE VISIT (OUTPATIENT)
Dept: FAMILY MEDICINE CLINIC | Facility: CLINIC | Age: 54
End: 2024-11-11
Payer: COMMERCIAL

## 2024-11-11 VITALS
SYSTOLIC BLOOD PRESSURE: 122 MMHG | HEIGHT: 63 IN | DIASTOLIC BLOOD PRESSURE: 81 MMHG | TEMPERATURE: 98 F | HEART RATE: 93 BPM | BODY MASS INDEX: 30.3 KG/M2 | RESPIRATION RATE: 18 BRPM | WEIGHT: 171 LBS | OXYGEN SATURATION: 98 %

## 2024-11-11 DIAGNOSIS — E11.9 TYPE 2 DIABETES MELLITUS WITHOUT COMPLICATION, WITHOUT LONG-TERM CURRENT USE OF INSULIN (HCC): ICD-10-CM

## 2024-11-11 DIAGNOSIS — Z28.21 INFLUENZA VACCINATION DECLINED BY PATIENT: ICD-10-CM

## 2024-11-11 DIAGNOSIS — I10 PRIMARY HYPERTENSION: ICD-10-CM

## 2024-11-11 DIAGNOSIS — N95.1 MENOPAUSAL SYNDROME (HOT FLASHES): ICD-10-CM

## 2024-11-11 DIAGNOSIS — Z28.21 PNEUMOCOCCAL VACCINATION DECLINED BY PATIENT: Primary | ICD-10-CM

## 2024-11-11 DIAGNOSIS — Z28.21 VARICELLA ZOSTER VIRUS (VZV) VACCINATION DECLINED: ICD-10-CM

## 2024-11-11 LAB — HEMOGLOBIN A1C: 6.1 % (ref 4.3–5.6)

## 2024-11-11 PROCEDURE — 99214 OFFICE O/P EST MOD 30 MIN: CPT | Performed by: FAMILY MEDICINE

## 2024-11-11 PROCEDURE — 83036 HEMOGLOBIN GLYCOSYLATED A1C: CPT | Performed by: FAMILY MEDICINE

## 2024-11-11 RX ORDER — FEZOLINETANT 45 MG/1
45 TABLET, FILM COATED ORAL DAILY
Qty: 90 TABLET | Refills: 0 | Status: SHIPPED | OUTPATIENT
Start: 2024-11-11

## 2024-11-11 RX ORDER — SIMVASTATIN 20 MG
20 TABLET ORAL NIGHTLY
Qty: 90 TABLET | Refills: 1 | Status: SHIPPED | OUTPATIENT
Start: 2024-11-11

## 2024-11-11 RX ORDER — AMLODIPINE BESYLATE 10 MG/1
10 TABLET ORAL DAILY
Qty: 90 TABLET | Refills: 3 | Status: SHIPPED | OUTPATIENT
Start: 2024-11-11

## 2024-11-11 RX ORDER — TIRZEPATIDE 5 MG/.5ML
5 INJECTION, SOLUTION SUBCUTANEOUS WEEKLY
Qty: 2 ML | Refills: 3 | Status: SHIPPED | OUTPATIENT
Start: 2024-11-11

## 2024-11-11 NOTE — PATIENT INSTRUCTIONS
All adult screening ordered and done appropriate for patient's age and gender and risk factors and complaints.  Recommend weight loss via daily exercising and consistent healthy dietary changes.  Encouraged physical fitness and daily physical activity daily.  Medication reviewed and renewed where needed and appropriate.  Comply with medications.  Feels a 45 mg tablet has been prescribed for menopausal symptoms.

## 2024-11-20 NOTE — PROGRESS NOTES
Subjective:     Patient ID: Tino Hoff is a 54 year old female.    This patient is a 54-year-old well-established hypertensive/diabetic -American female who presents today primarily with relentless menopausal symptoms.  Patient has night sweats and hot flashes with regularity.  Patient's sleep is disturbed.  Patient has moved challenges as well.  Patient wants to take any type of medication that would be helpful to her with minimal threat to her general health.    Patient declines pneumonia, influenza, and shingles vaccine offered on today.    Patient denies headaches, chest pain, dizziness shortness of breath, acute visual changes, exertional fatigue, urinary frequency, and/or excessive thirst.    We will be referring the patient for deep funduscopic eye exam for diabetes.    Diabetic status update with A1c and microscopic urine assessment collected on today.  A1c is 6.1.        History/Other:   Review of Systems  Current Outpatient Medications   Medication Sig Dispense Refill    amLODIPine 10 MG Oral Tab Take 1 tablet (10 mg total) by mouth daily. 90 tablet 3    Fezolinetant (VEOZAH) 45 MG Oral Tab Take 45 mg by mouth daily. 90 tablet 0    simvastatin 20 MG Oral Tab Take 1 tablet (20 mg total) by mouth nightly. 90 tablet 1    Tirzepatide (MOUNJARO) 5 MG/0.5ML Subcutaneous Solution Auto-injector Inject 5 mg into the skin once a week. 2 mL 3    Tirzepatide (MOUNJARO) 5 MG/0.5ML Subcutaneous Solution Pen-injector Inject 5 mg into the skin once a week. 2 mL 3    semaglutide (OZEMPIC, 0.25 OR 0.5 MG/DOSE,) 2 MG/3ML Subcutaneous Solution Pen-injector Inject 0.5 mg into the skin once a week. (Patient not taking: Reported on 11/11/2024) 3 mL 1    cyclobenzaprine 10 MG Oral Tab Take 1 tablet (10 mg total) by mouth 2 (two) times daily. (Patient not taking: Reported on 11/11/2024)      Continuous Blood Gluc Sensor (FREESTYLE LEONARDO 3 SENSOR) Does not apply Misc 1 each every 14 (fourteen) days. 2 each 5    Continuous  Blood Gluc Sensor (FREESTYLE LEONARDO 3 SENSOR) Does not apply Misc 1 each every 14 (fourteen) days. 2 each 5    cloNIDine 0.2 MG/24HR Transdermal Patch Weekly Place 1 patch onto the skin once a week. (Patient not taking: Reported on 11/11/2024) 12 patch 1    losartan 50 MG Oral Tab Take 1 tablet (50 mg total) by mouth daily. (Patient not taking: Reported on 11/11/2024) 90 tablet 1    metFORMIN  MG Oral Tablet 24 Hr Take 1 tablet (500 mg total) by mouth 2 (two) times daily. (Patient not taking: Reported on 11/11/2024) 180 tablet 0    Multiple Vitamin (MULTIVITAMIN ADULT OR) Take by mouth daily. (Patient not taking: Reported on 11/11/2024)      levocetirizine 5 MG Oral Tab Take 1 tablet (5 mg total) by mouth every evening. (Patient not taking: Reported on 11/11/2024) 90 tablet 1    diphenhydrAMINE 25 MG Oral Cap Take 1 capsule (25 mg total) by mouth. (Patient not taking: Reported on 11/11/2024)      Continuous Blood Gluc  (FREESTYLE LEONARDO 2 READER) Does not apply Device 1 Stick TID AC&HS. 1 each 0    Continuous Blood Gluc Sensor (FREESTYLE LEONARDO 2 SENSOR) Does not apply Misc 1 Stick TID AC&HS. 1 each 11    Fexofenadine-Pseudoephed ER  MG Oral Tablet 12 Hr Take 1 tablet by mouth 2 (two) times daily. (Patient not taking: Reported on 11/11/2024) 30 tablet 0     Allergies:Allergies[1]    Past Medical History:    High blood pressure    High cholesterol      Past Surgical History:   Procedure Laterality Date    Colonoscopy N/A 7/29/2024    Procedure: COLONOSCOPY;  Surgeon: Lori Dennison MD;  Location: [facility] MAIN OR    Cosmetic surgery      Hysterectomy  01/01/2007      Family History   Problem Relation Age of Onset    Stroke Mother     Cancer Sister         lung      Social History:   Social History     Socioeconomic History    Marital status: Single   Tobacco Use    Smoking status: Never    Smokeless tobacco: Never   Vaping Use    Vaping status: Never Used   Substance and Sexual Activity    Alcohol use:  Yes     Comment: socially; 4 drinks per month    Drug use: Never   Other Topics Concern    Caffeine Concern Yes     Comment: coffee        Objective:   Vitals:    11/11/24 1641   BP: 122/81   Pulse:    Resp:    Temp:        Physical Exam  Constitutional:       Appearance: Normal appearance. She is not ill-appearing.   HENT:      Head: Normocephalic and atraumatic.      Right Ear: Tympanic membrane normal.      Left Ear: Tympanic membrane normal.      Nose: Nose normal.      Mouth/Throat:      Mouth: Mucous membranes are moist.   Cardiovascular:      Rate and Rhythm: Normal rate and regular rhythm.      Heart sounds:      No gallop.   Pulmonary:      Effort: Pulmonary effort is normal.      Breath sounds: Normal breath sounds.   Neurological:      Mental Status: She is alert and oriented to person, place, and time.   Psychiatric:         Mood and Affect: Mood normal.         Assessment & Plan:   1. Menopausal syndrome (hot flashes)  Prescribed.  Patient may need GYN consultation.  - Fezolinetant (VEOZAH) 45 MG Oral Tab; Take 45 mg by mouth daily.  Dispense: 90 tablet; Refill: 0    2. Pneumococcal vaccination declined by patient  Declined by patient.  - Prevnar 20 (PCV20) [81841]    3. Primary hypertension  Blood pressure measures to goal.  Compliance with medication encouraged.  Proper dietary intake and safe exercise at 150 minutes/week recommended.  - amLODIPine 10 MG Oral Tab; Take 1 tablet (10 mg total) by mouth daily.  Dispense: 90 tablet; Refill: 3    4. Type 2 diabetes mellitus without complication, without long-term current use of insulin (HCC)  Patient referred.  The following labs have been ordered.  - POC Glycohemoglobin [25237]; A1c at 6.1 which is great.  - Microalb/Creat Ratio, Random Urine [E]; Future  - Ophthalmology Referral - In Network    5. Varicella zoster virus (VZV) vaccination declined  Declined by patient.  - Zoster Recombinant Adjuvanted (Shingrix -Shingles) [75421]    6. Influenza  vaccination declined by patient  Declined patient.  - Fluzone trivalent vaccine, PF 0.5mL, 6mo+ (13346)      Orders Placed This Encounter   Procedures    POC Glycohemoglobin [65209]    Microalb/Creat Ratio, Random Urine [E]    Prevnar 20 (PCV20) [72126]    Zoster Recombinant Adjuvanted (Shingrix -Shingles) [89377]    Fluzone trivalent vaccine, PF 0.5mL, 6mo+ (86707)       Meds This Visit:  Requested Prescriptions     Signed Prescriptions Disp Refills    amLODIPine 10 MG Oral Tab 90 tablet 3     Sig: Take 1 tablet (10 mg total) by mouth daily.    Fezolinetant (VEOZAH) 45 MG Oral Tab 90 tablet 0     Sig: Take 45 mg by mouth daily.    simvastatin 20 MG Oral Tab 90 tablet 1     Sig: Take 1 tablet (20 mg total) by mouth nightly.    Tirzepatide (MOUNJARO) 5 MG/0.5ML Subcutaneous Solution Auto-injector 2 mL 3     Sig: Inject 5 mg into the skin once a week.       Imaging & Referrals:  PCV20 VACCINE FOR INTRAMUSCULAR USE  ZOSTER VACC RECOMBINANT IM NJX  INFLUENZA VACCINE, TRI, PRESERV FREE, 0.5 ML  OPHTHALMOLOGY - INTERNAL     Patient Instructions   All adult screening ordered and done appropriate for patient's age and gender and risk factors and complaints.  Recommend weight loss via daily exercising and consistent healthy dietary changes.  Encouraged physical fitness and daily physical activity daily.  Medication reviewed and renewed where needed and appropriate.  Comply with medications.  Feels a 45 mg tablet has been prescribed for menopausal symptoms.    Return in about 1 year (around 11/11/2025), or if symptoms worsen or fail to improve.         [1] No Known Allergies

## 2024-12-26 ENCOUNTER — LAB ENCOUNTER (OUTPATIENT)
Dept: LAB | Age: 54
End: 2024-12-26
Attending: FAMILY MEDICINE
Payer: COMMERCIAL

## 2024-12-26 DIAGNOSIS — E11.9 TYPE 2 DIABETES MELLITUS WITHOUT COMPLICATION, WITHOUT LONG-TERM CURRENT USE OF INSULIN (HCC): ICD-10-CM

## 2024-12-26 LAB
CREAT UR-SCNC: 279.2 MG/DL
MICROALBUMIN UR-MCNC: 0.8 MG/DL
MICROALBUMIN/CREAT 24H UR-RTO: 2.9 UG/MG (ref ?–30)

## 2024-12-26 PROCEDURE — 82043 UR ALBUMIN QUANTITATIVE: CPT

## 2024-12-26 PROCEDURE — 82570 ASSAY OF URINE CREATININE: CPT

## 2025-01-06 DIAGNOSIS — N95.1 MENOPAUSAL SYNDROME (HOT FLASHES): ICD-10-CM

## 2025-01-06 RX ORDER — TIRZEPATIDE 2.5 MG/.5ML
2.5 INJECTION, SOLUTION SUBCUTANEOUS WEEKLY
Qty: 2 ML | Refills: 4 | Status: SHIPPED | OUTPATIENT
Start: 2025-01-06

## 2025-01-06 NOTE — TELEPHONE ENCOUNTER
Endocrine Refill protocol for oral and injectable diabetic medications    Protocol Criteria:  FAILED  Reason: No Visit in required time frame mcm sent    If all below requirements are met, send a 90-day supply with 1 refill per provider protocol.    Verify appointment with Endocrinology completed in the last 6 months or scheduled in the next 3 months.  Verify A1C has been completed within the last 6 months and is below 8.5%     Last completed office visit: 5/20/2024 Wendy Ceja APRN   Next scheduled Follow up: No future appointments. [facility] sent  Last A1c result: Last A1c value was 6.1% done 11/11/2024.

## 2025-01-09 RX ORDER — FEZOLINETANT 45 MG/1
1 TABLET, FILM COATED ORAL DAILY
Qty: 90 TABLET | Refills: 0 | OUTPATIENT
Start: 2025-01-09

## 2025-01-30 DIAGNOSIS — N95.1 MENOPAUSAL SYNDROME (HOT FLASHES): ICD-10-CM

## 2025-02-04 RX ORDER — FEZOLINETANT 45 MG/1
45 TABLET, FILM COATED ORAL DAILY
Qty: 90 TABLET | Refills: 0 | OUTPATIENT
Start: 2025-02-04

## 2025-02-04 NOTE — TELEPHONE ENCOUNTER
Per Office visit from 11/11/2024     Assessment & Plan:  1. Menopausal syndrome (hot flashes)  Prescribed.  Patient may need GYN consultation.  - Fezolinetant (VEOZAH) 45 MG Oral Tab; Take 45 mg by mouth daily.  Dispense: 90 tablet; Refill: 0    My chart message sent to patient for follow up with GYN for refills.

## 2025-02-21 RX ORDER — TIRZEPATIDE 5 MG/.5ML
5 INJECTION, SOLUTION SUBCUTANEOUS WEEKLY
Qty: 2 ML | Refills: 3 | Status: CANCELLED | OUTPATIENT
Start: 2025-02-21

## 2025-03-18 RX ORDER — FEZOLINETANT 45 MG/1
45 TABLET, FILM COATED ORAL DAILY
Qty: 90 TABLET | Refills: 0 | Status: SHIPPED | OUTPATIENT
Start: 2025-03-18 | End: 2025-06-16

## 2025-04-01 ENCOUNTER — TELEPHONE (OUTPATIENT)
Facility: CLINIC | Age: 55
End: 2025-04-01

## 2025-04-01 NOTE — TELEPHONE ENCOUNTER
Patient outreach message received:    patient outreach to repeat 1 year.     Recall reminder letter sent out to patient via [employer] ???.

## 2025-05-02 ENCOUNTER — LAB ENCOUNTER (OUTPATIENT)
Dept: LAB | Age: 55
End: 2025-05-02
Attending: FAMILY MEDICINE
Payer: COMMERCIAL

## 2025-05-02 ENCOUNTER — OFFICE VISIT (OUTPATIENT)
Dept: FAMILY MEDICINE CLINIC | Facility: CLINIC | Age: 55
End: 2025-05-02
Payer: COMMERCIAL

## 2025-05-02 VITALS
SYSTOLIC BLOOD PRESSURE: 123 MMHG | HEART RATE: 86 BPM | RESPIRATION RATE: 18 BRPM | TEMPERATURE: 98 F | OXYGEN SATURATION: 97 % | WEIGHT: 172 LBS | DIASTOLIC BLOOD PRESSURE: 77 MMHG | HEIGHT: 63 IN | BODY MASS INDEX: 30.48 KG/M2

## 2025-05-02 DIAGNOSIS — N95.1 MENOPAUSAL SYNDROME (HOT FLASHES): ICD-10-CM

## 2025-05-02 DIAGNOSIS — Z23 NEED FOR PNEUMOCOCCAL VACCINATION: ICD-10-CM

## 2025-05-02 DIAGNOSIS — Z23 NEED FOR VARICELLA VACCINE: ICD-10-CM

## 2025-05-02 DIAGNOSIS — I10 PRIMARY HYPERTENSION: ICD-10-CM

## 2025-05-02 DIAGNOSIS — E11.9 TYPE 2 DIABETES MELLITUS WITHOUT COMPLICATION, WITHOUT LONG-TERM CURRENT USE OF INSULIN (HCC): ICD-10-CM

## 2025-05-02 DIAGNOSIS — Z12.31 ENCOUNTER FOR SCREENING MAMMOGRAM FOR BREAST CANCER: ICD-10-CM

## 2025-05-02 DIAGNOSIS — E11.9 TYPE 2 DIABETES MELLITUS WITHOUT COMPLICATION, WITHOUT LONG-TERM CURRENT USE OF INSULIN (HCC): Primary | ICD-10-CM

## 2025-05-02 DIAGNOSIS — M99.02 SOMATIC DYSFUNCTION OF THORACOLUMBAR REGION: ICD-10-CM

## 2025-05-02 DIAGNOSIS — M99.01 CERVICAL SOMATIC DYSFUNCTION: ICD-10-CM

## 2025-05-02 LAB
ALBUMIN SERPL-MCNC: 4.6 G/DL (ref 3.2–4.8)
ALBUMIN/GLOB SERPL: 1.6 {RATIO} (ref 1–2)
ALP LIVER SERPL-CCNC: 64 U/L (ref 46–118)
ALT SERPL-CCNC: 29 U/L (ref 10–49)
ANION GAP SERPL CALC-SCNC: 6 MMOL/L (ref 0–18)
AST SERPL-CCNC: 24 U/L (ref ?–34)
BILIRUB SERPL-MCNC: 0.4 MG/DL (ref 0.3–1.2)
BUN BLD-MCNC: 11 MG/DL (ref 9–23)
BUN/CREAT SERPL: 11.6 (ref 10–20)
CALCIUM BLD-MCNC: 9.5 MG/DL (ref 8.7–10.4)
CHLORIDE SERPL-SCNC: 106 MMOL/L (ref 98–112)
CO2 SERPL-SCNC: 29 MMOL/L (ref 21–32)
CREAT BLD-MCNC: 0.95 MG/DL (ref 0.55–1.02)
CREAT UR-SCNC: 204.9 MG/DL
EGFRCR SERPLBLD CKD-EPI 2021: 70 ML/MIN/1.73M2 (ref 60–?)
EST. AVERAGE GLUCOSE BLD GHB EST-MCNC: 137 MG/DL (ref 68–126)
FASTING STATUS PATIENT QL REPORTED: NO
GLOBULIN PLAS-MCNC: 2.8 G/DL (ref 2–3.5)
GLUCOSE BLD-MCNC: 95 MG/DL (ref 70–99)
HBA1C MFR BLD: 6.4 % (ref ?–5.7)
MICROALBUMIN UR-MCNC: 0.4 MG/DL
MICROALBUMIN/CREAT 24H UR-RTO: 2 UG/MG (ref ?–30)
OSMOLALITY SERPL CALC.SUM OF ELEC: 291 MOSM/KG (ref 275–295)
POTASSIUM SERPL-SCNC: 3.8 MMOL/L (ref 3.5–5.1)
PROT SERPL-MCNC: 7.4 G/DL (ref 5.7–8.2)
SODIUM SERPL-SCNC: 141 MMOL/L (ref 136–145)

## 2025-05-02 PROCEDURE — 82570 ASSAY OF URINE CREATININE: CPT

## 2025-05-02 PROCEDURE — 82043 UR ALBUMIN QUANTITATIVE: CPT

## 2025-05-02 PROCEDURE — 36415 COLL VENOUS BLD VENIPUNCTURE: CPT

## 2025-05-02 PROCEDURE — 83036 HEMOGLOBIN GLYCOSYLATED A1C: CPT

## 2025-05-02 PROCEDURE — 80053 COMPREHEN METABOLIC PANEL: CPT

## 2025-05-02 RX ORDER — TIRZEPATIDE 5 MG/.5ML
5 INJECTION, SOLUTION SUBCUTANEOUS WEEKLY
Qty: 2 ML | Refills: 3 | Status: SHIPPED | OUTPATIENT
Start: 2025-05-02

## 2025-05-02 RX ORDER — FEZOLINETANT 45 MG/1
45 TABLET, FILM COATED ORAL DAILY
Qty: 90 TABLET | Refills: 0 | Status: SHIPPED | OUTPATIENT
Start: 2025-05-02

## 2025-05-02 NOTE — PATIENT INSTRUCTIONS
All adult screening ordered and done appropriate for patient's age and gender and risk factors and complaints.  Recommend weight loss via daily exercising and consistent healthy dietary changes.  Encouraged physical fitness and daily physical activity daily.  Monitor blood pressures and record at home. Limit salt intake.  Comply with medications.

## 2025-05-03 NOTE — PROGRESS NOTES
Subjective:     Patient ID: Tino Hoff is a 54 year old female.    This patient is a 54-year-old diabetic/hypertensive -American female who presents to the clinic to do a follow-up on the response to treatment regarding her diabetes. Patient denies urinary frequency or excessive thirst and no visual disturbance. Patient currently denies headaches, chest pain, dizziness, shortness of breath, acute visual changes, and/or exertional fatigue.  Patient is compliant with her medication and also conscious of her intake and she is physically active.    Diabetic foot exam due on next exam.  Patient needs referral to ophthalmology for diabetic eye exam.    Patient consents to pneumococcal and shingles vaccine during this encounter.  Patient is soon due for colonoscopy.  Patient's mammogram is scheduled for June 18, 2025.    Patient needs manual adjustment on today.  Cervical and thoracolumbar regions have been addressed and patient obtain immediate release/relief.          History/Other:   Review of Systems  Current Medications[1]  Allergies:Allergies[2]    Past Medical History[3]   Past Surgical History[4]   Family History[5]   Social History: Short Social Hx on File[6]     Objective:   Vitals:    05/02/25 1146   BP: 123/77   Pulse: 86   Resp: 18   Temp: 98 °F (36.7 °C)       Physical Exam  Constitutional:       General: She is not in acute distress.     Appearance: Normal appearance. She is not ill-appearing.   HENT:      Right Ear: Tympanic membrane normal.      Left Ear: Tympanic membrane normal.      Nose: Nose normal.      Mouth/Throat:      Mouth: Mucous membranes are moist.   Cardiovascular:      Rate and Rhythm: Normal rate and regular rhythm.      Heart sounds:      No gallop.   Pulmonary:      Effort: Pulmonary effort is normal.      Breath sounds: Normal breath sounds.   Musculoskeletal:      Cervical back: Spasms present. Decreased range of motion.      Thoracic back: Spasms and tenderness present.       Lumbar back: Spasms and tenderness present. Decreased range of motion.        Back:       Comments: Regions of malrotation and paraspinal spasm as depicted.   Feet:      Comments: Bilateral barefoot skin diabetic exam is normal, visualized feet and the appearance is normal.  Bilateral monofilament/sensation of both feet is normal.  Pulsation pedal pulse exam of both lower legs/feet is normal as well.        Neurological:      Mental Status: She is alert and oriented to person, place, and time.   Psychiatric:         Mood and Affect: Mood normal.         Assessment & Plan:   1. Type 2 diabetes mellitus without complication, without long-term current use of insulin ([facility])  Diabetic status update on today.  The following has been ordered and prescription has been refilled.  - Microalb/Creat Ratio, Random Urine [E]; Future  - Comp Metabolic Panel (14) [E]; Future  - Hemoglobin A1C [E]; Future  - Tirzepatide (MOUNJARO) 5 MG/0.5ML Subcutaneous Solution Auto-injector; Inject 5 mg into the skin once a week.  Dispense: 2 mL; Refill: 3  - Ophthalmology Referral - In Network    2. Primary hypertension  Blood pressure measures to goal.  Compliance with medication encouraged and emphasized.    3. Menopausal syndrome (hot flashes)  Prescription refilled.  - Fezolinetant (VEOZAH) 45 MG Oral Tab; Take 45 mg by mouth daily.  Dispense: 90 tablet; Refill: 0    4. Encounter for screening mammogram for breast cancer  Ordered.  - [facility] DEE DEE 2D+3D SCREENING BILAT (CPT=77067/68484); Future    5. Need for pneumococcal vaccination  Ordered and administered.    6. Need for varicella vaccine  Ordered and administered.    7. Cervical somatic dysfunction  Osteopathic manipulation performed in the cervical region.  Immediate release obtained.  - OSTEOPATHIC MANIP,3-4 BODY REGN    8. Somatic dysfunction of thoracolumbar region  Osteopathic manipulation performed in the thoracic and lumbar region.  - OSTEOPATHIC MANIP,3-4 BODY REGN      Orders Placed  This Encounter   Procedures    Microalb/Creat Ratio, Random Urine [E]    Comp Metabolic Panel (14) [E]    Hemoglobin A1C [E]    OSTEOPATHIC MANIP,3-4 BODY REGN       Meds This Visit:  Requested Prescriptions     Signed Prescriptions Disp Refills    Fezolinetant (VEOZAH) 45 MG Oral Tab 90 tablet 0     Sig: Take 45 mg by mouth daily.    Tirzepatide (MOUNJARO) 5 MG/0.5ML Subcutaneous Solution Auto-injector 2 mL 3     Sig: Inject 5 mg into the skin once a week.       Imaging & Referrals:  OPHTHALMOLOGY - INTERNAL  TANK DEE DEE 2D+3D SCREENING BILAT (CPT=77067/39472)     Patient Instructions   All adult screening ordered and done appropriate for patient's age and gender and risk factors and complaints.  Recommend weight loss via daily exercising and consistent healthy dietary changes.  Encouraged physical fitness and daily physical activity daily.  Monitor blood pressures and record at home. Limit salt intake.  Comply with medications.    Return in about 3 months (around 8/2/2025), or if symptoms worsen or fail to improve.         [1]   Current Outpatient Medications   Medication Sig Dispense Refill    Fezolinetant (VEOZAH) 45 MG Oral Tab Take 45 mg by mouth daily. 90 tablet 0    Tirzepatide (MOUNJARO) 5 MG/0.5ML Subcutaneous Solution Auto-injector Inject 5 mg into the skin once a week. 2 mL 3    amLODIPine 10 MG Oral Tab Take 1 tablet (10 mg total) by mouth daily. 90 tablet 3    simvastatin 20 MG Oral Tab Take 1 tablet (20 mg total) by mouth nightly. 90 tablet 1    Fezolinetant (VEOZAH) 45 MG Oral Tab Take 45 mg by mouth daily. 90 tablet 0    Tirzepatide (MOUNJARO) 2.5 MG/0.5ML Subcutaneous Solution Auto-injector INJECT 2.5 MG SUBCUTANEOUSLY WEEKLY (Patient not taking: Reported on 5/2/2025) 2 mL 4    cyclobenzaprine 10 MG Oral Tab Take 1 tablet (10 mg total) by mouth 2 (two) times daily. (Patient not taking: Reported on 11/11/2024)      cloNIDine 0.2 MG/24HR Transdermal Patch Weekly Place 1 patch onto the skin once a week.  (Patient not taking: Reported on 5/2/2025) 12 patch 1    losartan 50 MG Oral Tab Take 1 tablet (50 mg total) by mouth daily. (Patient not taking: Reported on 11/11/2024) 90 tablet 1    metFORMIN  MG Oral Tablet 24 Hr Take 1 tablet (500 mg total) by mouth 2 (two) times daily. (Patient not taking: Reported on 11/11/2024) 180 tablet 0    Multiple Vitamin (MULTIVITAMIN ADULT OR) Take by mouth daily. (Patient not taking: Reported on 11/11/2024)      levocetirizine 5 MG Oral Tab Take 1 tablet (5 mg total) by mouth every evening. (Patient not taking: Reported on 11/11/2024) 90 tablet 1    diphenhydrAMINE 25 MG Oral Cap Take 1 capsule (25 mg total) by mouth. (Patient not taking: Reported on 11/11/2024)      Continuous Blood Gluc  (FREESTYLE LEONARDO 2 READER) Does not apply Device 1 Stick TID AC&HS. 1 each 0    Continuous Blood Gluc Sensor (FREESTYLE LEONARDO 2 SENSOR) Does not apply Misc 1 Stick TID AC&HS. 1 each 11    Fexofenadine-Pseudoephed ER  MG Oral Tablet 12 Hr Take 1 tablet by mouth 2 (two) times daily. (Patient not taking: Reported on 11/11/2024) 30 tablet 0   [2] No Known Allergies  [3]   Past Medical History:   High blood pressure    High cholesterol   [4]   Past Surgical History:  Procedure Laterality Date    Colonoscopy N/A 7/29/2024    Procedure: COLONOSCOPY;  Surgeon: Lori Dennison MD;  Location: [facility] MAIN OR    Cosmetic surgery      Hysterectomy  01/01/2007   [5]   Family History  Problem Relation Age of Onset    Stroke Mother     Cancer Sister         lung   [6]   Social History  Socioeconomic History    Marital status: Single   Tobacco Use    Smoking status: Never    Smokeless tobacco: Never   Vaping Use    Vaping status: Never Used   Substance and Sexual Activity    Alcohol use: Yes     Comment: socially; 4 drinks per month    Drug use: Never   Other Topics Concern    Caffeine Concern Yes     Comment: coffee

## 2025-05-03 NOTE — PROCEDURES
Multiple vertebral segments in the cervical and thoracolumbar region misaligned. Manual osteopathic manipulative therapy performed and immediate relief obtained. Patient instantaneously improved.

## 2025-05-07 ENCOUNTER — HOSPITAL ENCOUNTER (OUTPATIENT)
Dept: MAMMOGRAPHY | Age: 55
Discharge: HOME | End: 2025-05-07
Attending: FAMILY MEDICINE
Payer: COMMERCIAL

## 2025-05-07 DIAGNOSIS — Z12.31 ENCOUNTER FOR SCREENING MAMMOGRAM FOR BREAST CANCER: ICD-10-CM

## 2025-05-07 PROCEDURE — 77067 SCR MAMMO BI INCL CAD: CPT | Performed by: FAMILY MEDICINE

## 2025-05-07 PROCEDURE — 77063 BREAST TOMOSYNTHESIS BI: CPT | Performed by: FAMILY MEDICINE

## 2025-06-02 RX ORDER — SIMVASTATIN 20 MG
20 TABLET ORAL NIGHTLY
Qty: 90 TABLET | Refills: 3 | Status: SHIPPED | OUTPATIENT
Start: 2025-06-02

## 2025-06-12 DIAGNOSIS — E11.9 TYPE 2 DIABETES MELLITUS WITHOUT COMPLICATION, WITHOUT LONG-TERM CURRENT USE OF INSULIN (HCC): ICD-10-CM

## 2025-06-12 DIAGNOSIS — N95.1 MENOPAUSAL SYNDROME (HOT FLASHES): ICD-10-CM

## 2025-06-13 RX ORDER — TIRZEPATIDE 5 MG/.5ML
5 INJECTION, SOLUTION SUBCUTANEOUS WEEKLY
Qty: 2 ML | Refills: 3 | OUTPATIENT
Start: 2025-06-13

## 2025-06-13 RX ORDER — FEZOLINETANT 45 MG/1
45 TABLET, FILM COATED ORAL DAILY
Qty: 90 TABLET | Refills: 0 | OUTPATIENT
Start: 2025-06-13

## 2025-06-13 NOTE — TELEPHONE ENCOUNTER
Veozah 45m day supply sent to [facility] in [place] on 2025    Mounjaro 5m month supply sent to [facility] in [place] on 2025

## 2025-06-18 NOTE — TELEPHONE ENCOUNTER
Received FMLA forms from [employer] to be completed and faxed back to number listed on the forms. Forms faxed and interoffice to [facility] for completion, confirmation rcvd. Vision screening done today (06/18/25) as part of a preventative care visit.  Results vision results: normal   [facility] [place] [facility] Vision Screener - Potential condition: none    Vision Screen     Right Eye -  OD           SE: +0.50  DS: +0.75  DC: -0.50  Axis: @175    Distance in between: 61 mm    Left Eye - OS  SE: +0.50  DS: +0.50  DC: -0.25  Axis: @19

## 2025-07-15 ENCOUNTER — OFFICE VISIT (OUTPATIENT)
Dept: FAMILY MEDICINE CLINIC | Facility: CLINIC | Age: 55
End: 2025-07-15
Payer: COMMERCIAL

## 2025-07-15 ENCOUNTER — HOSPITAL ENCOUNTER (OUTPATIENT)
Dept: GENERAL RADIOLOGY | Age: 55
Discharge: HOME | End: 2025-07-15
Attending: FAMILY MEDICINE
Payer: COMMERCIAL

## 2025-07-15 VITALS
BODY MASS INDEX: 30 KG/M2 | WEIGHT: 171 LBS | OXYGEN SATURATION: 97 % | TEMPERATURE: 98 F | RESPIRATION RATE: 18 BRPM | HEART RATE: 65 BPM | DIASTOLIC BLOOD PRESSURE: 66 MMHG | SYSTOLIC BLOOD PRESSURE: 104 MMHG

## 2025-07-15 DIAGNOSIS — M99.04: ICD-10-CM

## 2025-07-15 DIAGNOSIS — M54.16 LUMBAR RADICULOPATHY, CHRONIC: ICD-10-CM

## 2025-07-15 DIAGNOSIS — M99.02 SOMATIC DYSFUNCTION OF THORACOLUMBAR REGION: ICD-10-CM

## 2025-07-15 DIAGNOSIS — M79.651 PAIN OF RIGHT THIGH: ICD-10-CM

## 2025-07-15 DIAGNOSIS — Z28.21 PNEUMOCOCCAL VACCINATION DECLINED BY PATIENT: ICD-10-CM

## 2025-07-15 DIAGNOSIS — M54.16 ACUTE RIGHT LUMBAR RADICULOPATHY: ICD-10-CM

## 2025-07-15 DIAGNOSIS — M79.651 PAIN OF RIGHT THIGH: Primary | ICD-10-CM

## 2025-07-15 PROCEDURE — 72110 X-RAY EXAM L-2 SPINE 4/>VWS: CPT | Performed by: FAMILY MEDICINE

## 2025-07-15 PROCEDURE — 99214 OFFICE O/P EST MOD 30 MIN: CPT | Performed by: FAMILY MEDICINE

## 2025-07-15 PROCEDURE — 98926 OSTEOPATH MANJ 3-4 REGIONS: CPT | Performed by: FAMILY MEDICINE

## 2025-07-15 NOTE — PROGRESS NOTES
Subjective:     Patient ID: Tino Hoff is a 55 year old female.    This patient is a 55-year-old well-established -American female who has well-controlled diabetes who presents to the clinic with 3 weeks of sharp/fleeting anterior thigh pain without any history of trauma or fall.  There is no recent low back injury, however she does give a report of years ago assisting her obese status mother up some stairs along with her sister and she remembers that her \"back went out\".    There is no numbness or tingling associated with this discomfort.  Even as she explains her complaint, there are certain positions that she can move and recreate the discomfort.    Patient has done some Internet research and is concerned that she has at meralgia paresthetica.    This very well could be diabetic amyotrophy also known as proximal neuropathy.    Structurally the patient had several thoracolumbar vertebrae out of line.  After adjustment, the patient seems to have relief in her symptoms.    Patient was given a recommendation to get a plain lumbar film and a standby referral to physical medicine.                  History/Other:   Review of Systems  Current Medications[1]  Allergies:Allergies[2]    Past Medical History[3]   Past Surgical History[4]   Family History[5]   Social History: Short Social Hx on File[6]     Objective:   Vitals:    07/15/25 1112   BP: 104/66   Pulse: 65   Resp: 18   Temp: 98 °F (36.7 °C)       Physical Exam  Constitutional:       Appearance: Normal appearance. She is not ill-appearing.   Cardiovascular:      Rate and Rhythm: Normal rate and regular rhythm.      Heart sounds: Normal heart sounds.      No gallop.   Pulmonary:      Effort: Pulmonary effort is normal.      Breath sounds: Normal breath sounds.   Musculoskeletal:      Thoracic back: Spasms and tenderness present.      Lumbar back: Spasms and tenderness present.        Back:       Right upper leg: Tenderness present.        Legs:        Comments: Region of discomfort when it occurs.    Lower rotations and paraspinal spasm in thoracic and lumbar regions as depicted.       Neurological:      Mental Status: She is alert.         Assessment & Plan:   1. Pain of right thigh  The following has been ordered and the patient has a standby referral to physical medicine.  - XR LUMBAR SPINE (MIN 4 VIEWS) (CPT=72110); Future  - Physiatry Referral - In Network    2. Lumbar radiculopathy, chronic  This could be diabetic amyotrophy-doubt meralgia paresthetica.  This could also be secondary to malrotation of several vertebrae in the lumbosacral region.    3. Somatic dysfunction of thoracolumbar region  Osteopathic manipulation performed in the thoracolumbar and sacroiliac regions with immediate release obtained.  - OSTEOPATHIC MANIP,3-4 BODY REGN    4. Somatic dysfunction of sacrococcygeal region  Same as #3.  - OSTEOPATHIC MANIP,3-4 BODY REGN    5. Pneumococcal vaccination declined by patient  Prevnar declined by patient.  - Prevnar 20 (PCV20) [78560]    6. Acute right lumbar radiculopathy  The following has been ordered.  - XR LUMBAR SPINE (MIN 4 VIEWS) (CPT=72110); Future  - Physiatry Referral - In Network      Orders Placed This Encounter   Procedures    Prevnar 20 (PCV20) [28425]    OSTEOPATHIC MANIP,3-4 BODY REGN       Meds This Visit:  Requested Prescriptions      No prescriptions requested or ordered in this encounter       Imaging & Referrals:  PCV20 VACCINE FOR INTRAMUSCULAR USE  PHYSIATRY - INTERNAL     Patient Instructions   X-ray recommended for the lumbar spine.  We will make an assessment for lumbar region adjustment to see if this brings about any symptom relief.  Patient has a standby referral for physical medicine consultants.  Patient may need an MRI of the lumbar spine regarding acute back strain history which occurred years ago.    Return if symptoms worsen or fail to improve.         [1]   Current Outpatient Medications   Medication Sig  Dispense Refill    simvastatin 20 MG Oral Tab Take 1 tablet (20 mg total) by mouth nightly. 90 tablet 3    Tirzepatide (MOUNJARO) 5 MG/0.5ML Subcutaneous Solution Auto-injector Inject 5 mg into the skin once a week. 2 mL 3    amLODIPine 10 MG Oral Tab Take 1 tablet (10 mg total) by mouth daily. 90 tablet 3    Fezolinetant (VEOZAH) 45 MG Oral Tab Take 45 mg by mouth daily. 90 tablet 0    Tirzepatide (MOUNJARO) 2.5 MG/0.5ML Subcutaneous Solution Auto-injector INJECT 2.5 MG SUBCUTANEOUSLY WEEKLY (Patient not taking: Reported on 5/2/2025) 2 mL 4    cyclobenzaprine 10 MG Oral Tab Take 1 tablet (10 mg total) by mouth 2 (two) times daily. (Patient not taking: Reported on 11/11/2024)      cloNIDine 0.2 MG/24HR Transdermal Patch Weekly Place 1 patch onto the skin once a week. (Patient not taking: Reported on 5/2/2025) 12 patch 1    losartan 50 MG Oral Tab Take 1 tablet (50 mg total) by mouth daily. (Patient not taking: Reported on 11/11/2024) 90 tablet 1    metFORMIN  MG Oral Tablet 24 Hr Take 1 tablet (500 mg total) by mouth 2 (two) times daily. (Patient not taking: Reported on 11/11/2024) 180 tablet 0    Multiple Vitamin (MULTIVITAMIN ADULT OR) Take by mouth daily. (Patient not taking: Reported on 11/11/2024)      levocetirizine 5 MG Oral Tab Take 1 tablet (5 mg total) by mouth every evening. (Patient not taking: Reported on 11/11/2024) 90 tablet 1    diphenhydrAMINE 25 MG Oral Cap Take 1 capsule (25 mg total) by mouth. (Patient not taking: Reported on 11/11/2024)      Continuous Blood Gluc  (FREESTYLE LEONARDO 2 READER) Does not apply Device 1 Stick TID AC&HS. 1 each 0    Continuous Blood Gluc Sensor (FREESTYLE LEONARDO 2 SENSOR) Does not apply Misc 1 Stick TID AC&HS. 1 each 11    Fexofenadine-Pseudoephed ER  MG Oral Tablet 12 Hr Take 1 tablet by mouth 2 (two) times daily. (Patient not taking: Reported on 11/11/2024) 30 tablet 0   [2] No Known Allergies  [3]   Past Medical History:   High blood pressure     High cholesterol   [4]   Past Surgical History:  Procedure Laterality Date    Colonoscopy N/A 7/29/2024    Procedure: COLONOSCOPY;  Surgeon: Lori Dennison MD;  Location: [facility] MAIN OR    Cosmetic surgery      Hysterectomy  01/01/2007   [5]   Family History  Problem Relation Age of Onset    Stroke Mother     Cancer Sister         lung   [6]   Social History  Socioeconomic History    Marital status: Single   Tobacco Use    Smoking status: Never    Smokeless tobacco: Never   Vaping Use    Vaping status: Never Used   Substance and Sexual Activity    Alcohol use: Yes     Comment: socially; 4 drinks per month    Drug use: Never   Other Topics Concern    Caffeine Concern Yes     Comment: coffee

## 2025-07-15 NOTE — PATIENT INSTRUCTIONS
X-ray recommended for the lumbar spine.  We will make an assessment for lumbar region adjustment to see if this brings about any symptom relief.  Patient has a standby referral for physical medicine consultants.  Patient may need an MRI of the lumbar spine regarding acute back strain history which occurred years ago.

## 2025-07-24 ENCOUNTER — OFFICE VISIT (OUTPATIENT)
Dept: FAMILY MEDICINE CLINIC | Facility: CLINIC | Age: 55
End: 2025-07-24

## 2025-07-24 VITALS
WEIGHT: 167 LBS | RESPIRATION RATE: 18 BRPM | TEMPERATURE: 98 F | SYSTOLIC BLOOD PRESSURE: 123 MMHG | BODY MASS INDEX: 30 KG/M2 | OXYGEN SATURATION: 97 % | DIASTOLIC BLOOD PRESSURE: 71 MMHG | HEART RATE: 70 BPM

## 2025-07-24 DIAGNOSIS — I10 PRIMARY HYPERTENSION: ICD-10-CM

## 2025-07-24 DIAGNOSIS — Z23 NEED FOR VACCINATION: ICD-10-CM

## 2025-07-24 DIAGNOSIS — M54.16 ACUTE RIGHT LUMBAR RADICULOPATHY: ICD-10-CM

## 2025-07-24 DIAGNOSIS — M54.50 ACUTE MIDLINE LOW BACK PAIN, UNSPECIFIED WHETHER SCIATICA PRESENT: Primary | ICD-10-CM

## 2025-07-24 DIAGNOSIS — Z28.21 PNEUMOCOCCAL VACCINATION DECLINED BY PATIENT: ICD-10-CM

## 2025-07-24 DIAGNOSIS — E11.9 TYPE 2 DIABETES MELLITUS WITHOUT COMPLICATION, WITHOUT LONG-TERM CURRENT USE OF INSULIN (HCC): ICD-10-CM

## 2025-07-24 PROCEDURE — 99214 OFFICE O/P EST MOD 30 MIN: CPT | Performed by: FAMILY MEDICINE

## 2025-07-24 RX ORDER — TIRZEPATIDE 5 MG/.5ML
5 INJECTION, SOLUTION SUBCUTANEOUS WEEKLY
Qty: 2 ML | Refills: 3 | Status: SHIPPED | OUTPATIENT
Start: 2025-07-24

## 2025-07-24 RX ORDER — METHYLPREDNISOLONE 4 MG/1
TABLET ORAL
Qty: 1 EACH | Refills: 0 | Status: SHIPPED | OUTPATIENT
Start: 2025-07-24

## 2025-07-24 NOTE — PROGRESS NOTES
Subjective:     Patient ID: Tino Hoff is a 55 year old female.    This 55-year-old -American female is doing a follow-up from lumbar radicular discomfort that she reported on her recent last visit.  Patient did obtain notable relief after osteopathic manipulation, however the patient began to experience incapacitating midline low back pain without any radiation after shopping some heavy items and bending forward in order to try to paint 1 of those items.  When the patient tried to stand upright, she immediately felt spasm and remained in the flexed position until she can go into the house get some pain medication and lay down.    Patient presents today able to stand up and ambulate somewhat better, but she is still compromised.  Pain is localized in the psoas regions bilaterally and it does not radiate into the buttocks or down the legs.    Patient is here for additional assessment and hopeful diagnosis and treatment.    Patient was encouraged to proceed with physiatry consultation which was recommended when she had the radicular lumbar pain into her right anterior thigh on her last visit.            History/Other:   Review of Systems  Current Medications[1]  Allergies:Allergies[2]    Past Medical History[3]   Past Surgical History[4]   Family History[5]   Social History: Short Social Hx on File[6]     Objective:   Vitals:    07/24/25 1118   BP: 123/71   Pulse: 70   Resp: 18   Temp: 97.7 °F (36.5 °C)       Physical Exam  Constitutional:       General: She is in acute distress.      Appearance: Normal appearance. She is not ill-appearing.   Cardiovascular:      Rate and Rhythm: Normal rate and regular rhythm.      Heart sounds:      No gallop.   Pulmonary:      Effort: Pulmonary effort is normal.      Breath sounds: Normal breath sounds.   Musculoskeletal:      Thoracic back: Spasms and tenderness present.      Lumbar back: Spasms and tenderness present. Decreased range of motion.        Back:        Comments: Region of discomfort and paraspinal spasm in the lumbar section as depicted.   Neurological:      Mental Status: She is alert and oriented to person, place, and time.         Assessment & Plan:   1. Acute midline low back pain, unspecified whether sciatica present  The following has been prescribed and the patient has been encouraged to make her appointment with physiatry.  - methylPREDNISolone (MEDROL) 4 MG Oral Tablet Therapy Pack; As directed.  Dispense: 1 each; Refill: 0    2. Acute right lumbar radiculopathy  Lumbar radiculopathy has essentially resolved, however still with spasm is acute and ongoing.  See #1.    3. Primary hypertension  Blood pressure measures to goal.  Compliance with medication and also minimize salt intake recommended.    4. Type 2 diabetes mellitus without complication, without long-term current use of insulin (HCC)  Medication refilled.  - Tirzepatide (MOUNJARO) 5 MG/0.5ML Subcutaneous Solution Auto-injector; Inject 5 mg into the skin once a week.  Dispense: 2 mL; Refill: 3    5. Need for vaccination  By patient.  - Zoster Vac (Shingrix) in office vaccine- Non-Medicare or Medicare with ABN    6. Pneumococcal vaccination declined by patient  By patient.  - Prevnar 20 (PCV20) [95306]      Orders Placed This Encounter   Procedures    Prevnar 20 (PCV20) [12720]    Zoster Vac (Shingrix) in office vaccine- Non-Medicare or Medicare with ABN       Meds This Visit:  Requested Prescriptions     Signed Prescriptions Disp Refills    Tirzepatide (MOUNJARO) 5 MG/0.5ML Subcutaneous Solution Auto-injector 2 mL 3     Sig: Inject 5 mg into the skin once a week.    methylPREDNISolone (MEDROL) 4 MG Oral Tablet Therapy Pack 1 each 0     Sig: As directed.       Imaging & Referrals:  PCV20 VACCINE FOR INTRAMUSCULAR USE  ZOSTER VACC RECOMBINANT IM NJX     Patient Instructions   Pain management via prescription medications as needed.  Comply with medications.  Medrol Dosepak has been  prescribed.  Patient to be seen by physiatry for further evaluation and treatment.  Increase water intake as well as passive stretching if the patient can tolerate.    Return in about 6 weeks (around 9/4/2025), or if symptoms worsen or fail to improve.         [1]   Current Outpatient Medications   Medication Sig Dispense Refill    Tirzepatide (MOUNJARO) 5 MG/0.5ML Subcutaneous Solution Auto-injector Inject 5 mg into the skin once a week. 2 mL 3    methylPREDNISolone (MEDROL) 4 MG Oral Tablet Therapy Pack As directed. 1 each 0    simvastatin 20 MG Oral Tab Take 1 tablet (20 mg total) by mouth nightly. 90 tablet 3    Fezolinetant (VEOZAH) 45 MG Oral Tab Take 45 mg by mouth daily. 90 tablet 0    Tirzepatide (MOUNJARO) 2.5 MG/0.5ML Subcutaneous Solution Auto-injector INJECT 2.5 MG SUBCUTANEOUSLY WEEKLY (Patient not taking: Reported on 5/2/2025) 2 mL 4    amLODIPine 10 MG Oral Tab Take 1 tablet (10 mg total) by mouth daily. 90 tablet 3    cyclobenzaprine 10 MG Oral Tab Take 1 tablet (10 mg total) by mouth 2 (two) times daily. (Patient not taking: Reported on 11/11/2024)      cloNIDine 0.2 MG/24HR Transdermal Patch Weekly Place 1 patch onto the skin once a week. (Patient not taking: Reported on 5/2/2025) 12 patch 1    losartan 50 MG Oral Tab Take 1 tablet (50 mg total) by mouth daily. (Patient not taking: Reported on 11/11/2024) 90 tablet 1    metFORMIN  MG Oral Tablet 24 Hr Take 1 tablet (500 mg total) by mouth 2 (two) times daily. (Patient not taking: Reported on 11/11/2024) 180 tablet 0    Multiple Vitamin (MULTIVITAMIN ADULT OR) Take by mouth daily. (Patient not taking: Reported on 11/11/2024)      levocetirizine 5 MG Oral Tab Take 1 tablet (5 mg total) by mouth every evening. (Patient not taking: Reported on 11/11/2024) 90 tablet 1    diphenhydrAMINE 25 MG Oral Cap Take 1 capsule (25 mg total) by mouth. (Patient not taking: Reported on 11/11/2024)      Continuous Blood Gluc  (FREESTYLE LEONARDO 2 READER)  Does not apply Device 1 Stick TID AC&HS. 1 each 0    Continuous Blood Gluc Sensor (FREESTYLE LEONARDO 2 SENSOR) Does not apply Misc 1 Stick TID AC&HS. 1 each 11    Fexofenadine-Pseudoephed ER  MG Oral Tablet 12 Hr Take 1 tablet by mouth 2 (two) times daily. (Patient not taking: Reported on 11/11/2024) 30 tablet 0   [2] No Known Allergies  [3]   Past Medical History:   High blood pressure    High cholesterol   [4]   Past Surgical History:  Procedure Laterality Date    Colonoscopy N/A 7/29/2024    Procedure: COLONOSCOPY;  Surgeon: Lori Dennison MD;  Location: [facility] MAIN OR    Cosmetic surgery      Hysterectomy  01/01/2007   [5]   Family History  Problem Relation Age of Onset    Stroke Mother     Cancer Sister         lung   [6]   Social History  Socioeconomic History    Marital status: Single   Tobacco Use    Smoking status: Never    Smokeless tobacco: Never   Vaping Use    Vaping status: Never Used   Substance and Sexual Activity    Alcohol use: Yes     Comment: socially; 4 drinks per month    Drug use: Never   Other Topics Concern    Caffeine Concern Yes     Comment: coffee

## 2025-07-24 NOTE — PATIENT INSTRUCTIONS
Pain management via prescription medications as needed.  Comply with medications.  Medrol Dosepak has been prescribed.  Patient to be seen by physiatry for further evaluation and treatment.  Increase water intake as well as passive stretching if the patient can tolerate.

## 2025-08-18 ENCOUNTER — OFFICE VISIT (OUTPATIENT)
Dept: FAMILY MEDICINE CLINIC | Facility: CLINIC | Age: 55
End: 2025-08-18

## 2025-08-18 VITALS
SYSTOLIC BLOOD PRESSURE: 126 MMHG | WEIGHT: 173.63 LBS | OXYGEN SATURATION: 96 % | HEART RATE: 81 BPM | TEMPERATURE: 98 F | DIASTOLIC BLOOD PRESSURE: 83 MMHG | BODY MASS INDEX: 31 KG/M2

## 2025-08-18 DIAGNOSIS — Z28.21 PNEUMOCOCCAL VACCINATION DECLINED BY PATIENT: ICD-10-CM

## 2025-08-18 DIAGNOSIS — J01.90 ACUTE RHINOSINUSITIS: ICD-10-CM

## 2025-08-18 DIAGNOSIS — M43.10 RETROLISTHESIS: ICD-10-CM

## 2025-08-18 DIAGNOSIS — M54.16 ACUTE RIGHT LUMBAR RADICULOPATHY: ICD-10-CM

## 2025-08-18 DIAGNOSIS — Z28.21 VARICELLA ZOSTER VIRUS (VZV) VACCINATION DECLINED: ICD-10-CM

## 2025-08-18 DIAGNOSIS — M54.50 ACUTE MIDLINE LOW BACK PAIN, UNSPECIFIED WHETHER SCIATICA PRESENT: Primary | ICD-10-CM

## 2025-08-18 PROCEDURE — 99214 OFFICE O/P EST MOD 30 MIN: CPT | Performed by: FAMILY MEDICINE

## 2025-08-18 RX ORDER — AZITHROMYCIN 250 MG/1
TABLET, FILM COATED ORAL
Qty: 6 TABLET | Refills: 0 | Status: SHIPPED | OUTPATIENT
Start: 2025-08-18 | End: 2025-08-23

## 2025-08-30 ENCOUNTER — HOSPITAL ENCOUNTER (OUTPATIENT)
Dept: MRI IMAGING | Facility: HOSPITAL | Age: 55
Discharge: HOME | End: 2025-08-30
Attending: FAMILY MEDICINE

## 2025-08-30 DIAGNOSIS — M54.16 ACUTE RIGHT LUMBAR RADICULOPATHY: ICD-10-CM

## 2025-08-30 DIAGNOSIS — M54.50 ACUTE MIDLINE LOW BACK PAIN, UNSPECIFIED WHETHER SCIATICA PRESENT: ICD-10-CM

## 2025-08-30 DIAGNOSIS — M43.10 RETROLISTHESIS: ICD-10-CM

## 2025-08-30 PROCEDURE — 72148 MRI LUMBAR SPINE W/O DYE: CPT | Performed by: FAMILY MEDICINE

## (undated) NOTE — MR AVS SNAPSHOT
[facility]  [address], [address]  [phone]               Thank you for choosing us for your health care visit with Manasa Pringle DO.   We are glad to serve you and happy to provide you with this sum Disability forms done. All adult screening ordered and done appropriate for patient's age and gender and risk factors and complaints.        Allergies as of Feb 24, 2017     No Known Allergies                Today's Vital Signs     BP Pulse Temp Height Support Staff. Remember, MyChart is NOT to be used for urgent needs. For medical emergencies, dial 911.            Visit [facility] online at  [email or web address]

## (undated) NOTE — LETTER
2/6/2023              [facility]        [address]         Dear Ciro Camara records indicate that the tests ordered for you by Evert Love MD  have not been done. If you have, in fact, already completed the tests or you do not wish to have the tests done, please contact our office at [address] [place] [address]. Otherwise, please proceed with the testing.        Sincerely,    Evert Love MD  [facility], [facility]  [address] [place] [id number] [phone]

## (undated) NOTE — MR AVS SNAPSHOT
[facility]  [address], [address]  [phone]               Thank you for choosing us for your health care visit with Ronak Logan DO.   We are glad to serve you and happy to provide you with this sum Enter your Lift Activation Code exactly as it appears below along with your Zip Code and Date of Birth to complete the sign-up process. If you do not sign up before the expiration date, you must request a new code.     Your unique Lift Access Code: [email or web address]

## (undated) NOTE — LETTER
5/23/2025    Tino Hoff        [address] [facility] [address]            Dear Tino Hoff,      Our records indicate that you are due for an appointment for a Colonoscopy with Lori Dennison MD. Our doctors are booking out about 3-6 months in advance for procedures.     Please call our office to schedule this appointment.  Your medical well-being is important to us.    If your insurance requires a referral, please call your primary care office to request one.      Thank you,      The Physicians and Staff at [facility]

## (undated) NOTE — LETTER
4/1/2025    Tino Hoff        [address] [facility] [address]            Dear Tino Hoff,      Our records indicate that you are due for an appointment for a Colonoscopy with Lori Dennison MD. Our doctors are booking out about 3-6 months in advance for procedures.     Please call our office to schedule this appointment.  Your medical well-being is important to us.    If your insurance requires a referral, please call your primary care office to request one.      Thank you,      The Physicians and Staff at [facility]

## (undated) NOTE — MR AVS SNAPSHOT
[facility]  [address], [address]  [phone]               Thank you for choosing us for your health care visit with Raul Vital DO.   We are glad to serve you and happy to provide you with this sum Return in about 3 months (around 5/17/2017), or if symptoms worsen or fail to improve. Scheduling Instructions     Friday February 17, 2017     Imaging:  TANK SCREENING BILAT (BPO=[id number])    Instructions:   To schedule a test at any [facility] AT [facility] If you have questions, you can call [phone] to talk to our [facility] Staff. Remember, MyChart is NOT to be used for urgent needs. For medical emergencies, dial 911.         Educational Information     Your blood pressure indicates you may be a Tips for increasing your physical activity – Adults who are physically active are less likely to develop some chronic diseases than adults who are inactive.      HOW TO GET STARTED: HOW TO STAY MOTIVATED:   Start activities slowly and build up over time Do